# Patient Record
Sex: MALE | Race: WHITE | NOT HISPANIC OR LATINO | Employment: OTHER | URBAN - METROPOLITAN AREA
[De-identification: names, ages, dates, MRNs, and addresses within clinical notes are randomized per-mention and may not be internally consistent; named-entity substitution may affect disease eponyms.]

---

## 2019-10-29 ENCOUNTER — TRANSFERRED RECORDS (OUTPATIENT)
Dept: HEALTH INFORMATION MANAGEMENT | Facility: CLINIC | Age: 69
End: 2019-10-29

## 2021-07-31 ENCOUNTER — HOSPITAL ENCOUNTER (EMERGENCY)
Facility: CLINIC | Age: 71
Discharge: HOME | End: 2021-07-31
Attending: EMERGENCY MEDICINE | Admitting: EMERGENCY MEDICINE
Payer: MEDICARE

## 2021-07-31 ENCOUNTER — APPOINTMENT (OUTPATIENT)
Dept: MRI IMAGING | Facility: CLINIC | Age: 71
End: 2021-07-31
Attending: EMERGENCY MEDICINE
Payer: MEDICARE

## 2021-07-31 VITALS
HEIGHT: 70 IN | RESPIRATION RATE: 16 BRPM | TEMPERATURE: 96.6 F | HEART RATE: 79 BPM | BODY MASS INDEX: 34.36 KG/M2 | WEIGHT: 240 LBS | SYSTOLIC BLOOD PRESSURE: 125 MMHG | DIASTOLIC BLOOD PRESSURE: 81 MMHG | OXYGEN SATURATION: 93 %

## 2021-07-31 DIAGNOSIS — R42 VERTIGO: ICD-10-CM

## 2021-07-31 LAB
ALBUMIN UR-MCNC: NEGATIVE MG/DL
ANION GAP SERPL CALCULATED.3IONS-SCNC: 2 MMOL/L (ref 3–14)
APPEARANCE UR: CLEAR
ATRIAL RATE - MUSE: 80 BPM
BASOPHILS # BLD AUTO: 0 10E3/UL (ref 0–0.2)
BASOPHILS NFR BLD AUTO: 1 %
BILIRUB UR QL STRIP: NEGATIVE
BUN SERPL-MCNC: 13 MG/DL (ref 7–30)
CALCIUM SERPL-MCNC: 9.3 MG/DL (ref 8.5–10.1)
CHLORIDE BLD-SCNC: 106 MMOL/L (ref 94–109)
CO2 SERPL-SCNC: 31 MMOL/L (ref 20–32)
COLOR UR AUTO: YELLOW
CREAT SERPL-MCNC: 1.12 MG/DL (ref 0.66–1.25)
DIASTOLIC BLOOD PRESSURE - MUSE: NORMAL MMHG
EOSINOPHIL # BLD AUTO: 0.1 10E3/UL (ref 0–0.7)
EOSINOPHIL NFR BLD AUTO: 2 %
ERYTHROCYTE [DISTWIDTH] IN BLOOD BY AUTOMATED COUNT: 13.1 % (ref 10–15)
GFR SERPL CREATININE-BSD FRML MDRD: 65 ML/MIN/1.73M2
GLUCOSE BLD-MCNC: 112 MG/DL (ref 70–99)
GLUCOSE UR STRIP-MCNC: NEGATIVE MG/DL
HCT VFR BLD AUTO: 44.3 % (ref 40–53)
HGB BLD-MCNC: 14.9 G/DL (ref 13.3–17.7)
HGB UR QL STRIP: NEGATIVE
IMM GRANULOCYTES # BLD: 0 10E3/UL
IMM GRANULOCYTES NFR BLD: 1 %
INTERPRETATION ECG - MUSE: NORMAL
KETONES UR STRIP-MCNC: NEGATIVE MG/DL
LEUKOCYTE ESTERASE UR QL STRIP: NEGATIVE
LYMPHOCYTES # BLD AUTO: 2.5 10E3/UL (ref 0.8–5.3)
LYMPHOCYTES NFR BLD AUTO: 38 %
MCH RBC QN AUTO: 33 PG (ref 26.5–33)
MCHC RBC AUTO-ENTMCNC: 33.6 G/DL (ref 31.5–36.5)
MCV RBC AUTO: 98 FL (ref 78–100)
MONOCYTES # BLD AUTO: 0.8 10E3/UL (ref 0–1.3)
MONOCYTES NFR BLD AUTO: 13 %
MUCOUS THREADS #/AREA URNS LPF: PRESENT /LPF
NEUTROPHILS # BLD AUTO: 3.1 10E3/UL (ref 1.6–8.3)
NEUTROPHILS NFR BLD AUTO: 45 %
NITRATE UR QL: NEGATIVE
NRBC # BLD AUTO: 0 10E3/UL
NRBC BLD AUTO-RTO: 0 /100
P AXIS - MUSE: 27 DEGREES
PH UR STRIP: 5.5 [PH] (ref 5–7)
PLATELET # BLD AUTO: 186 10E3/UL (ref 150–450)
POTASSIUM BLD-SCNC: 4.7 MMOL/L (ref 3.4–5.3)
PR INTERVAL - MUSE: 202 MS
QRS DURATION - MUSE: 126 MS
QT - MUSE: 406 MS
QTC - MUSE: 468 MS
R AXIS - MUSE: -84 DEGREES
RBC # BLD AUTO: 4.52 10E6/UL (ref 4.4–5.9)
RBC URINE: 2 /HPF
SODIUM SERPL-SCNC: 139 MMOL/L (ref 133–144)
SP GR UR STRIP: 1.02 (ref 1–1.03)
SYSTOLIC BLOOD PRESSURE - MUSE: NORMAL MMHG
T AXIS - MUSE: 5 DEGREES
UROBILINOGEN UR STRIP-MCNC: NORMAL MG/DL
VENTRICULAR RATE- MUSE: 80 BPM
WBC # BLD AUTO: 6.6 10E3/UL (ref 4–11)
WBC URINE: 0 /HPF

## 2021-07-31 PROCEDURE — 70551 MRI BRAIN STEM W/O DYE: CPT

## 2021-07-31 PROCEDURE — 36415 COLL VENOUS BLD VENIPUNCTURE: CPT | Performed by: EMERGENCY MEDICINE

## 2021-07-31 PROCEDURE — 85025 COMPLETE CBC W/AUTO DIFF WBC: CPT | Performed by: EMERGENCY MEDICINE

## 2021-07-31 PROCEDURE — 93005 ELECTROCARDIOGRAM TRACING: CPT

## 2021-07-31 PROCEDURE — 99285 EMERGENCY DEPT VISIT HI MDM: CPT | Mod: 25

## 2021-07-31 PROCEDURE — 250N000013 HC RX MED GY IP 250 OP 250 PS 637: Performed by: EMERGENCY MEDICINE

## 2021-07-31 PROCEDURE — 80048 BASIC METABOLIC PNL TOTAL CA: CPT | Performed by: EMERGENCY MEDICINE

## 2021-07-31 PROCEDURE — 81001 URINALYSIS AUTO W/SCOPE: CPT | Performed by: EMERGENCY MEDICINE

## 2021-07-31 RX ORDER — MECLIZINE HYDROCHLORIDE 25 MG/1
25 TABLET ORAL ONCE
Status: COMPLETED | OUTPATIENT
Start: 2021-07-31 | End: 2021-07-31

## 2021-07-31 RX ORDER — MECLIZINE HYDROCHLORIDE 25 MG/1
25 TABLET ORAL 4 TIMES DAILY PRN
Qty: 30 TABLET | Refills: 0 | Status: SHIPPED | OUTPATIENT
Start: 2021-07-31

## 2021-07-31 RX ADMIN — MECLIZINE HYDROCHLORIDE 25 MG: 25 TABLET ORAL at 21:21

## 2021-07-31 ASSESSMENT — ENCOUNTER SYMPTOMS
DIZZINESS: 1
LIGHT-HEADEDNESS: 0
WEAKNESS: 0
ABDOMINAL PAIN: 0
NUMBNESS: 0

## 2021-07-31 ASSESSMENT — MIFFLIN-ST. JEOR: SCORE: 1844.88

## 2021-07-31 NOTE — ED PROVIDER NOTES
"  History   Chief Complaint:  Dizziness       HPI   Anthony Briceno is a 71 year old male with history of hypertension and high cholesterol who presents with dizziness. He reports that when he was walking around noon today he kept falling to the left and felt dizzy. He denies pain. He reports that he ate some food and it did not help. He notes that he had a blood pressure of 136/102 at home and called his primary care who recommended that he come in. He reports that while sitting he feels \"fuzzy, not quite tingling but similar\". He reports that he feels like he was \"having a buzz without having alcohol\". He denies double vision. He denies lightheadedness today. He denies abdominal pain. He denies leg swelling. He denies numbness and weakness. He reports a past syncopal episode at a party and had an electrical heart problem at that time. He reports that he exercises about 45/50 minutes 3 times a week. He reports that he uses a walking stick for stability. He notes that he has difficulty with balance when not using his walking stick.       Review of Systems   Eyes: Negative for visual disturbance.   Cardiovascular: Negative for leg swelling.   Gastrointestinal: Negative for abdominal pain.   Neurological: Positive for dizziness. Negative for weakness, light-headedness and numbness.   All other systems reviewed and are negative.      Allergies:  Ancef [Cefazolin Sodium]    Medications:  Aspirin   Synthroid   Omeprazole   Claritin   Simvastatin     Past Medical History:    GERD  High cholesterol   Hypertension   Hypothyroid   Prostate cancer   Syncope     Past Surgical History:    Appendectomy   Colonoscopy  Genitourinary surgery  Hernia repair   Prostate surgery     Social History:  Presents with spouse.  PCP Akilah Fairchild PA-C.     Physical Exam     Patient Vitals for the past 24 hrs:   BP Temp Temp src Pulse Resp SpO2 Height Weight   07/31/21 1715 125/81 -- -- 79 -- 93 % -- --   07/31/21 1714 126/88 -- -- 81 -- 94 % -- " "--   07/31/21 1609 126/70 (!) 96.6  F (35.9  C) Temporal 83 16 95 % 1.778 m (5' 10\") 108.9 kg (240 lb)       Physical Exam  Eye:  Pupils are equal, round, and reactive.  Extraocular movements intact.    ENT:  No rhinorrhea.  Moist mucus membranes.  Normal tongue and tonsil.    Cardiac:  Regular rate and rhythm.  No murmurs, gallops, or rubs.    Pulmonary:  Clear to auscultation bilaterally.  No wheezes, rales, or rhonchi.    Abdomen:  Positive bowel sounds.  Abdomen is soft and non-distended, without focal tenderness.    Musculoskeletal:  Normal movement of all extremities without evidence for deficit.    Skin:  Warm and dry without rashes.    Neurologic:  CN II - XII intact.  5/5 strength in all extremities.  Normal sensation throughout.  Normal finger to nose and heel to shin.  2+ patellar reflexes.  Normal gait.    Psychiatric:  Normal affect with appropriate interaction with examiner.      Emergency Department Course   ECG  ECG taken at 1618, ECG read at 1845  NSR. Left axis deviation. Right bundle branch block. Minimal voltage criteria for LVH may be normal variant. Possible Lateral infarct, age undetermined. Inferior infarct, age undetermined.    No significant change as compared to prior, dated 7/26/16.  Rate 80 bpm. IN interval 202 ms. QRS duration 126 ms. QT/QTc 406/468 ms. P-R-T axes 27 -84 5.     Imaging:  MR Brain w/o contrast:  1. No acute intracranial abnormality.   2. Small chronic area of cortical/subcortical gliosis in the   paramedian left occipital lobe, which may represent a small chronic   infarct.   3. Mild generalized brain parenchymal volume loss and presumed chronic   small vessel ischemic disease.  as per radiology.     Laboratory:   CBC: WBC 6.6, HGB 14.9,   BMP: Glucose 112(H), Anion Gap: 2 (L), o/w WNL (Creatinine: 1.12)  UA: Mucous: Present, o/w Negative     Emergency Department Course:    Reviewed:  I reviewed nursing notes, vitals, past medical history and care " "everywhere    Assessments:  1814 I obtained history and examined the patient as noted above.   2055 I rechecked the patient and explained findings.     Interventions:  1843 sodium chloride 3 mL, IC    Disposition:  The patient was discharged to home.       Impression & Plan     CMS Diagnoses: None    Medical Decision Making:  This life a 71-year-old gentleman presents to us with a sensation that he is \"falling to the left.\"  He notes that this happened suddenly today when he first got out of bed.  He notes that he feels well when he is at rest.  However, when he turns his head or especially if he stands up that he starts to feel this dizziness sensation.  He has never experienced vertigo before.  He denies any true lightheadedness.  He denies vision changes, weakness, numbness, or slurring of his speech.    On my exam, he has an essentially normal neurologic exam.  However, he did feel return of his vertigo when he got up to walk to the bathroom.  Considering his age and his symptoms, I elected to obtain an MRI of the head which fortunately shows no evidence of a posterior circulation ischemic event.  With this, I feel central processes have been ruled out and he is safe for discharge home on meclizine.  He will follow closely with his primary team.  He will otherwise return to us for any worsening of condition or other emergent concerns.      Covid-19  Anthony Briceno was evaluated during a global COVID-19 pandemic, which necessitated consideration that the patient might be at risk for infection with the SARS-CoV-2 virus that causes COVID-19.   Applicable protocols for evaluation were followed during the patient's care.   COVID-19 was considered as part of the patient's evaluation.     Diagnosis:    ICD-10-CM    1. Vertigo  R42        Discharge Medications:  New Prescriptions    MECLIZINE (ANTIVERT) 25 MG TABLET    Take 1 tablet (25 mg) by mouth 4 times daily as needed for dizziness       Scribe Disclosure:  I, " Nory Valencia, am serving as a scribe at 5:08 PM on 7/31/2021 to document services personally performed by Trierweiler, Chad A, MD based on my observations and the provider's statements to me.        Trierweiler, Chad A, MD  08/01/21 [id number]

## 2021-07-31 NOTE — ED TRIAGE NOTES
"Patient got up and was walking. Became dizzy and states was falling to the left. States started 2 hours ago. Denies numbness or tingliing. Patient describes it as \"Having a buzz without having alcohol.\" Patient has equal, bilateral hand grasp. No facial droop noted. Tongue is aligned to center. Speech is clear. States has been using a walking stick for the past 3 years. Denies headache, numbness or tingling.     Patient had syncopal episode 7-8 years ago. \"something about heart. The next time he comes in he would need a pacemaker.\"  "

## 2024-02-09 ENCOUNTER — TRANSFERRED RECORDS (OUTPATIENT)
Dept: HEALTH INFORMATION MANAGEMENT | Facility: CLINIC | Age: 74
End: 2024-02-09
Payer: COMMERCIAL

## 2024-02-09 ENCOUNTER — MEDICAL CORRESPONDENCE (OUTPATIENT)
Dept: HEALTH INFORMATION MANAGEMENT | Facility: CLINIC | Age: 74
End: 2024-02-09
Payer: COMMERCIAL

## 2024-02-12 DIAGNOSIS — I20.0 UNSTABLE ANGINA (H): Primary | ICD-10-CM

## 2024-02-13 ENCOUNTER — OFFICE VISIT (OUTPATIENT)
Dept: CARDIOLOGY | Facility: CLINIC | Age: 74
End: 2024-02-13
Attending: FAMILY MEDICINE
Payer: MEDICARE

## 2024-02-13 VITALS
SYSTOLIC BLOOD PRESSURE: 118 MMHG | HEART RATE: 67 BPM | HEIGHT: 71 IN | WEIGHT: 245.9 LBS | BODY MASS INDEX: 34.43 KG/M2 | DIASTOLIC BLOOD PRESSURE: 76 MMHG

## 2024-02-13 DIAGNOSIS — I20.0 UNSTABLE ANGINA (H): ICD-10-CM

## 2024-02-13 DIAGNOSIS — R07.9 CHEST PAIN, UNSPECIFIED TYPE: Primary | ICD-10-CM

## 2024-02-13 PROCEDURE — 99204 OFFICE O/P NEW MOD 45 MIN: CPT | Performed by: INTERNAL MEDICINE

## 2024-02-13 PROCEDURE — 93000 ELECTROCARDIOGRAM COMPLETE: CPT | Performed by: INTERNAL MEDICINE

## 2024-02-13 RX ORDER — METOPROLOL SUCCINATE 25 MG/1
1 TABLET, EXTENDED RELEASE ORAL
COMMUNITY
Start: 2024-02-09

## 2024-02-13 RX ORDER — NITROGLYCERIN 0.4 MG/1
TABLET SUBLINGUAL
Qty: 15 TABLET | Refills: 1 | Status: SHIPPED | OUTPATIENT
Start: 2024-02-13

## 2024-02-13 NOTE — PROGRESS NOTES
CARDIOLOGY CONSULT    REASON FOR CONSULT: chest pain    PRIMARY CARE PHYSICIAN:  Felicia Fairchild    HISTORY OF PRESENT ILLNESS:  Mr. Briceno is a 73 year old gentleman with PMH significant for hypertension who presents for the evaluation of exertional chest discomfort.  Anthony states that he has been experiencing exertional chest discomfort over the past two months or so.  He describes it as a pressure in the center of the chest.   It generally comes on with activity and goes away with rest.  He does note it seems to take less activity to bring the discomfort on.   He was recently seen by his primary care provider who referred him to cardiology for further evaluation.  ECG at outside clinic demonstrates normal sinus rhythm, no ST segment changes.  Labs including BMP and CBC at outside clinic are within normal limits.      PAST MEDICAL HISTORY:   GERD   Hypertension   Prostate Cancer   GERD      MEDICATIONS:  Current Outpatient Medications   Medication    ASPIRIN PO    Levothyroxine Sodium (SYNTHROID PO)    loratadine (CLARITIN) 10 MG tablet    meclizine (ANTIVERT) 25 MG tablet    OMEPRAZOLE PO    SIMVASTATIN PO     No current facility-administered medications for this visit.       ALLERGIES:  Allergies   Allergen Reactions    Ancef [Cefazolin Sodium]      anaphyllaxis       SOCIAL HISTORY:  Nonsmoker, no significant ETOH    FAMILY HISTORY:  I have reviewed this patient's family history and updated it with pertinent information if needed.   No family history on file.    REVIEW OF SYSTEMS:  A complete ROS was obtained and the pertinent positives are outlined in the history of present illness above.  The remainder of systems is negative.      PHYSICAL EXAM:                     Vital Signs with Ranges  BP: ()/()   Arterial Line BP: ()/()   0 lbs 0 oz    Constitutional: awake, alert, no distress  Eyes: PERRL, sclera nonicteric  ENT: trachea midline  Respiratory: CTAB  Cardiovascular: RRR no m/r/g, no VJD  GI:  nondistended, nontender, bowel sounds present  Lymph/Hematologic: no lymphadenopathy  Skin: dry, no rash  Musculoskeletal: good muscle tone, strength 5/5 in upper and lower extremities  Neurologic: no focal deficits  Neuropsychiatric: appropriate affact    DATA:  Labs:   From outside clinic reviewed including CBC, BMP    EKG: From outside clinic reviewed.  Normal sinus rhythm without ST segment changes        ASSESSMENT:   Chest pain:  Concerning for accelerating angina.     Hypertension:  At goal   Hyperlipidemia:  On simvastatin    RECOMMENDATIONS:  1.  Recommend proceeding with coronary angiogram for definitive evaluation.  Given accelerating symptoms, this will be scheduled urgently.  Risks/benefits/alternatives were discussed with Anthony and he is agreeable to proceeding  2.  Continue ASA, statin  3.  Given rx for nitroglycerin 0.4 mg SL PRN chest pain and instructed on appropriate use  4.  Follow up after angiogram will be arranged      Alyssa Sinclair MD [facility]  February 13, 2024      I spent a total of 50 minutes providing patient care.  This time spent includes chart review, time spent with the patient during the clinic visit and time spent afterwards providing necessary documentation.

## 2024-02-13 NOTE — PATIENT INSTRUCTIONS
-Schedule coronary angiogram  -Continue ASA, simvastatin  -Nitroglycerin 0.4 mg under the tongue for chest pain at rest

## 2024-02-13 NOTE — LETTER
2/13/2024    Felicia Fairchild PA-C  [address] [facility] [address]  [place] [address]    RE: Anthony Briceno       Dear Colleague,     I had the pleasure of seeing Anthony Briceno in the [facility].  CARDIOLOGY CONSULT    REASON FOR CONSULT: chest pain    PRIMARY CARE PHYSICIAN:  Felicia Fairchild    HISTORY OF PRESENT ILLNESS:  Mr. Briceno is a 73 year old gentleman with PMH significant for hypertension who presents for the evaluation of exertional chest discomfort.  Anthony states that he has been experiencing exertional chest discomfort over the past two months or so.  He describes it as a pressure in the center of the chest.   It generally comes on with activity and goes away with rest.  He does note it seems to take less activity to bring the discomfort on.   He was recently seen by his primary care provider who referred him to cardiology for further evaluation.  ECG at outside clinic demonstrates normal sinus rhythm, no ST segment changes.  Labs including BMP and CBC at outside clinic are within normal limits.      PAST MEDICAL HISTORY:   GERD   Hypertension   Prostate Cancer   GERD      MEDICATIONS:  Current Outpatient Medications   Medication    ASPIRIN PO    Levothyroxine Sodium (SYNTHROID PO)    loratadine (CLARITIN) 10 MG tablet    meclizine (ANTIVERT) 25 MG tablet    OMEPRAZOLE PO    SIMVASTATIN PO     No current facility-administered medications for this visit.       ALLERGIES:  Allergies   Allergen Reactions    Ancef [Cefazolin Sodium]      anaphyllaxis       SOCIAL HISTORY:  Nonsmoker, no significant ETOH    FAMILY HISTORY:  I have reviewed this patient's family history and updated it with pertinent information if needed.   No family history on file.    REVIEW OF SYSTEMS:  A complete ROS was obtained and the pertinent positives are outlined in the history of present illness above.  The remainder of systems is negative.      PHYSICAL EXAM:                     Vital Signs with Ranges  BP:  ()/()   Arterial Line BP: ()/()   0 lbs 0 oz    Constitutional: awake, alert, no distress  Eyes: PERRL, sclera nonicteric  ENT: trachea midline  Respiratory: CTAB  Cardiovascular: RRR no m/r/g, no VJD  GI: nondistended, nontender, bowel sounds present  Lymph/Hematologic: no lymphadenopathy  Skin: dry, no rash  Musculoskeletal: good muscle tone, strength 5/5 in upper and lower extremities  Neurologic: no focal deficits  Neuropsychiatric: appropriate affact    DATA:  Labs:   From outside clinic reviewed including CBC, BMP    EKG: From outside clinic reviewed.  Normal sinus rhythm without ST segment changes        ASSESSMENT:   Chest pain:  Concerning for accelerating angina.     Hypertension:  At goal   Hyperlipidemia:  On simvastatin    RECOMMENDATIONS:  1.  Recommend proceeding with coronary angiogram for definitive evaluation.  Given accelerating symptoms, this will be scheduled urgently.  Risks/benefits/alternatives were discussed with Anthony and he is agreeable to proceeding  2.  Continue ASA, statin  3.  Given rx for nitroglycerin 0.4 mg SL PRN chest pain and instructed on appropriate use  4.  Follow up after angiogram will be arranged      Alyssa Sinclair MD [facility]  February 13, 2024      I spent a total of 50 minutes providing patient care.  This time spent includes chart review, time spent with the patient during the clinic visit and time spent afterwards providing necessary documentation.         Thank you for allowing me to participate in the care of your patient.      Sincerely,     Alyssa Sinclair MD     [facility]  cc:   Felicia Fairchild, HALLE  [address]  [place] [address]

## 2024-02-15 DIAGNOSIS — I20.0 UNSTABLE ANGINA (H): Primary | ICD-10-CM

## 2024-02-15 RX ORDER — ASPIRIN 325 MG
325 TABLET ORAL ONCE
Status: CANCELLED | OUTPATIENT
Start: 2024-02-15 | End: 2024-02-15

## 2024-02-15 RX ORDER — LIDOCAINE 40 MG/G
CREAM TOPICAL
Status: CANCELLED | OUTPATIENT
Start: 2024-02-15

## 2024-02-15 RX ORDER — POTASSIUM CHLORIDE 1500 MG/1
20 TABLET, EXTENDED RELEASE ORAL
Status: CANCELLED | OUTPATIENT
Start: 2024-02-15

## 2024-02-15 RX ORDER — SODIUM CHLORIDE 9 MG/ML
INJECTION, SOLUTION INTRAVENOUS CONTINUOUS
Status: CANCELLED | OUTPATIENT
Start: 2024-02-15

## 2024-02-15 RX ORDER — ASPIRIN 81 MG/1
243 TABLET, CHEWABLE ORAL ONCE
Status: CANCELLED | OUTPATIENT
Start: 2024-02-15

## 2024-02-15 NOTE — PROGRESS NOTES
Coronary angiogram/PCI/Right Heart Cath prep instructions.     Patient is scheduled for a Coronary Angiogram at [facility] - [address] [place] [address] - Main Entrance of the Hospital on 2/16/24.  Check in time is at 7:30 am and procedure to follow.    Patient instructed to remain NPO for solid foods 8 hours prior to arrival and may have clear liquids up to 2 hours prior to arrival.    Patient does not require extra fluids prior to procedure.    Patient is not diabetic.    Patient is not on anticoagulation.    Patient is not on diuretics.     Patient is taking ASA 81mg daily and will take 4 tabs (324mg) the morning of the procedure.    Pt is not on a SGLT2 inhibitor.    Pt is not on a GLP-1 Agonist    Patient advised to take their other daily medications the morning of the procedure with small sips of water.     Verified patient does not have a contrast allergy.    Verified patient has someone available to drive them home from the hospital and can stay with them for 24 hours after the procedure.     Patient advised to notify care team with any new COVID like symptoms prior to procedure.    Patient will check their temperature the morning of procedure and call [facility] at [phone] if temp is >100.0.    Patient is aware of visitor policy.    Patient expresses understanding of above instructions and denies further questions at this time.

## 2024-02-16 ENCOUNTER — HOSPITAL ENCOUNTER (OUTPATIENT)
Facility: CLINIC | Age: 74
Discharge: HOME | End: 2024-02-16
Admitting: INTERNAL MEDICINE
Payer: MEDICARE

## 2024-02-16 ENCOUNTER — APPOINTMENT (OUTPATIENT)
Dept: CARDIOLOGY | Facility: CLINIC | Age: 74
End: 2024-02-16
Attending: INTERNAL MEDICINE
Payer: MEDICARE

## 2024-02-16 VITALS
HEIGHT: 70 IN | RESPIRATION RATE: 18 BRPM | OXYGEN SATURATION: 95 % | BODY MASS INDEX: 35.07 KG/M2 | DIASTOLIC BLOOD PRESSURE: 65 MMHG | SYSTOLIC BLOOD PRESSURE: 100 MMHG | TEMPERATURE: 98 F | HEART RATE: 67 BPM | WEIGHT: 245 LBS

## 2024-02-16 DIAGNOSIS — I20.0 UNSTABLE ANGINA (H): ICD-10-CM

## 2024-02-16 DIAGNOSIS — Z98.61 POSTSURGICAL PERCUTANEOUS TRANSLUMINAL CORONARY ANGIOPLASTY STATUS: Primary | ICD-10-CM

## 2024-02-16 DIAGNOSIS — R07.9 CHEST PAIN, UNSPECIFIED TYPE: ICD-10-CM

## 2024-02-16 PROBLEM — Z98.890 STATUS POST CORONARY ANGIOGRAM: Status: ACTIVE | Noted: 2024-02-16

## 2024-02-16 LAB
ABO/RH(D): NORMAL
ACT BLD: 282 SECONDS (ref 74–150)
ANION GAP SERPL CALCULATED.3IONS-SCNC: 7 MMOL/L (ref 7–15)
ANTIBODY SCREEN: NEGATIVE
APTT PPP: 28 SECONDS (ref 22–38)
BUN SERPL-MCNC: 14.1 MG/DL (ref 8–23)
CALCIUM SERPL-MCNC: 9.1 MG/DL (ref 8.8–10.2)
CHLORIDE SERPL-SCNC: 103 MMOL/L (ref 98–107)
CREAT SERPL-MCNC: 1.08 MG/DL (ref 0.67–1.17)
DEPRECATED HCO3 PLAS-SCNC: 29 MMOL/L (ref 22–29)
EGFRCR SERPLBLD CKD-EPI 2021: 72 ML/MIN/1.73M2
ERYTHROCYTE [DISTWIDTH] IN BLOOD BY AUTOMATED COUNT: 13.2 % (ref 10–15)
GLUCOSE SERPL-MCNC: 110 MG/DL (ref 70–99)
HCT VFR BLD AUTO: 43.1 % (ref 40–53)
HGB BLD-MCNC: 13.1 G/DL (ref 13.3–17.7)
HGB BLD-MCNC: 14.7 G/DL (ref 13.3–17.7)
INR PPP: 0.96 (ref 0.85–1.15)
LVEF ECHO: NORMAL
MCH RBC QN AUTO: 33.3 PG (ref 26.5–33)
MCHC RBC AUTO-ENTMCNC: 34.1 G/DL (ref 31.5–36.5)
MCV RBC AUTO: 98 FL (ref 78–100)
PLATELET # BLD AUTO: 151 10E3/UL (ref 150–450)
POTASSIUM SERPL-SCNC: 4.1 MMOL/L (ref 3.4–5.3)
RBC # BLD AUTO: 4.42 10E6/UL (ref 4.4–5.9)
SODIUM SERPL-SCNC: 139 MMOL/L (ref 135–145)
SPECIMEN EXPIRATION DATE: NORMAL
WBC # BLD AUTO: 5.7 10E3/UL (ref 4–11)

## 2024-02-16 PROCEDURE — 99152 MOD SED SAME PHYS/QHP 5/>YRS: CPT | Mod: GC | Performed by: INTERNAL MEDICINE

## 2024-02-16 PROCEDURE — G0278 ILIAC ART ANGIO,CARDIAC CATH: HCPCS | Performed by: INTERNAL MEDICINE

## 2024-02-16 PROCEDURE — 93010 ELECTROCARDIOGRAM REPORT: CPT | Performed by: INTERNAL MEDICINE

## 2024-02-16 PROCEDURE — 85018 HEMOGLOBIN: CPT | Mod: 91

## 2024-02-16 PROCEDURE — 75625 CONTRAST EXAM ABDOMINL AORTA: CPT | Performed by: INTERNAL MEDICINE

## 2024-02-16 PROCEDURE — C1874 STENT, COATED/COV W/DEL SYS: HCPCS | Performed by: INTERNAL MEDICINE

## 2024-02-16 PROCEDURE — 85018 HEMOGLOBIN: CPT | Performed by: INTERNAL MEDICINE

## 2024-02-16 PROCEDURE — C1894 INTRO/SHEATH, NON-LASER: HCPCS | Performed by: INTERNAL MEDICINE

## 2024-02-16 PROCEDURE — 36415 COLL VENOUS BLD VENIPUNCTURE: CPT | Performed by: INTERNAL MEDICINE

## 2024-02-16 PROCEDURE — 250N000009 HC RX 250: Performed by: INTERNAL MEDICINE

## 2024-02-16 PROCEDURE — 93458 L HRT ARTERY/VENTRICLE ANGIO: CPT | Mod: XE | Performed by: INTERNAL MEDICINE

## 2024-02-16 PROCEDURE — 93454 CORONARY ARTERY ANGIO S&I: CPT | Performed by: INTERNAL MEDICINE

## 2024-02-16 PROCEDURE — C1769 GUIDE WIRE: HCPCS | Performed by: INTERNAL MEDICINE

## 2024-02-16 PROCEDURE — 250N000011 HC RX IP 250 OP 636: Performed by: INTERNAL MEDICINE

## 2024-02-16 PROCEDURE — C9600 PERC DRUG-EL COR STENT SING: HCPCS | Mod: LD | Performed by: INTERNAL MEDICINE

## 2024-02-16 PROCEDURE — 250N000013 HC RX MED GY IP 250 OP 250 PS 637: Performed by: INTERNAL MEDICINE

## 2024-02-16 PROCEDURE — 80048 BASIC METABOLIC PNL TOTAL CA: CPT | Performed by: INTERNAL MEDICINE

## 2024-02-16 PROCEDURE — 93454 CORONARY ARTERY ANGIO S&I: CPT | Mod: 26 | Performed by: INTERNAL MEDICINE

## 2024-02-16 PROCEDURE — 93005 ELECTROCARDIOGRAM TRACING: CPT

## 2024-02-16 PROCEDURE — 999N000184 HC STATISTIC TELEMETRY

## 2024-02-16 PROCEDURE — 92928 PRQ TCAT PLMT NTRAC ST 1 LES: CPT | Mod: LD | Performed by: INTERNAL MEDICINE

## 2024-02-16 PROCEDURE — 36415 COLL VENOUS BLD VENIPUNCTURE: CPT

## 2024-02-16 PROCEDURE — 86900 BLOOD TYPING SEROLOGIC ABO: CPT

## 2024-02-16 PROCEDURE — 999N000071 HC STATISTIC HEART CATH LAB OR EP LAB

## 2024-02-16 PROCEDURE — 85347 COAGULATION TIME ACTIVATED: CPT

## 2024-02-16 PROCEDURE — 85730 THROMBOPLASTIN TIME PARTIAL: CPT | Performed by: INTERNAL MEDICINE

## 2024-02-16 PROCEDURE — C1760 CLOSURE DEV, VASC: HCPCS | Performed by: INTERNAL MEDICINE

## 2024-02-16 PROCEDURE — 999N000054 HC STATISTIC EKG NON-CHARGEABLE

## 2024-02-16 PROCEDURE — 258N000003 HC RX IP 258 OP 636: Performed by: INTERNAL MEDICINE

## 2024-02-16 PROCEDURE — 99152 MOD SED SAME PHYS/QHP 5/>YRS: CPT | Mod: XE | Performed by: INTERNAL MEDICINE

## 2024-02-16 PROCEDURE — C1887 CATHETER, GUIDING: HCPCS | Performed by: INTERNAL MEDICINE

## 2024-02-16 PROCEDURE — C1725 CATH, TRANSLUMIN NON-LASER: HCPCS | Performed by: INTERNAL MEDICINE

## 2024-02-16 PROCEDURE — 93325 DOPPLER ECHO COLOR FLOW MAPG: CPT

## 2024-02-16 PROCEDURE — 85610 PROTHROMBIN TIME: CPT | Performed by: INTERNAL MEDICINE

## 2024-02-16 PROCEDURE — 93308 TTE F-UP OR LMTD: CPT | Mod: 26 | Performed by: INTERNAL MEDICINE

## 2024-02-16 PROCEDURE — 272N000001 HC OR GENERAL SUPPLY STERILE: Performed by: INTERNAL MEDICINE

## 2024-02-16 PROCEDURE — 99153 MOD SED SAME PHYS/QHP EA: CPT | Performed by: INTERNAL MEDICINE

## 2024-02-16 DEVICE — CLOSURE ANGIOSEAL 6FR 610130: Type: IMPLANTABLE DEVICE | Status: FUNCTIONAL

## 2024-02-16 DEVICE — STENT COR ONYX FRONTIER 12X2.25MM ONYXNG22512UX: Type: IMPLANTABLE DEVICE | Status: FUNCTIONAL

## 2024-02-16 RX ORDER — ASPIRIN 325 MG
325 TABLET ORAL ONCE
Status: CANCELLED | OUTPATIENT
Start: 2024-02-16 | End: 2024-02-16

## 2024-02-16 RX ORDER — FENTANYL CITRATE 50 UG/ML
25 INJECTION, SOLUTION INTRAMUSCULAR; INTRAVENOUS
Status: DISCONTINUED | OUTPATIENT
Start: 2024-02-16 | End: 2024-02-16 | Stop reason: HOSPADM

## 2024-02-16 RX ORDER — HYDRALAZINE HYDROCHLORIDE 20 MG/ML
10 INJECTION INTRAMUSCULAR; INTRAVENOUS EVERY 4 HOURS PRN
Status: DISCONTINUED | OUTPATIENT
Start: 2024-02-16 | End: 2024-02-16 | Stop reason: HOSPADM

## 2024-02-16 RX ORDER — NALOXONE HYDROCHLORIDE 0.4 MG/ML
0.4 INJECTION, SOLUTION INTRAMUSCULAR; INTRAVENOUS; SUBCUTANEOUS
Status: DISCONTINUED | OUTPATIENT
Start: 2024-02-16 | End: 2024-02-16 | Stop reason: HOSPADM

## 2024-02-16 RX ORDER — NITROGLYCERIN 5 MG/ML
VIAL (ML) INTRAVENOUS
Status: DISCONTINUED | OUTPATIENT
Start: 2024-02-16 | End: 2024-02-16 | Stop reason: HOSPADM

## 2024-02-16 RX ORDER — FLUMAZENIL 0.1 MG/ML
0.2 INJECTION, SOLUTION INTRAVENOUS
Status: DISCONTINUED | OUTPATIENT
Start: 2024-02-16 | End: 2024-02-16 | Stop reason: HOSPADM

## 2024-02-16 RX ORDER — NALOXONE HYDROCHLORIDE 0.4 MG/ML
0.2 INJECTION, SOLUTION INTRAMUSCULAR; INTRAVENOUS; SUBCUTANEOUS
Status: DISCONTINUED | OUTPATIENT
Start: 2024-02-16 | End: 2024-02-16 | Stop reason: HOSPADM

## 2024-02-16 RX ORDER — IOPAMIDOL 755 MG/ML
INJECTION, SOLUTION INTRAVASCULAR
Status: DISCONTINUED | OUTPATIENT
Start: 2024-02-16 | End: 2024-02-16 | Stop reason: HOSPADM

## 2024-02-16 RX ORDER — SODIUM CHLORIDE 9 MG/ML
INJECTION, SOLUTION INTRAVENOUS CONTINUOUS
Status: CANCELLED | OUTPATIENT
Start: 2024-02-16

## 2024-02-16 RX ORDER — FENTANYL CITRATE 50 UG/ML
INJECTION, SOLUTION INTRAMUSCULAR; INTRAVENOUS
Status: DISCONTINUED | OUTPATIENT
Start: 2024-02-16 | End: 2024-02-16 | Stop reason: HOSPADM

## 2024-02-16 RX ORDER — ASPIRIN 81 MG/1
243 TABLET, CHEWABLE ORAL ONCE
Status: COMPLETED | OUTPATIENT
Start: 2024-02-16 | End: 2024-02-16

## 2024-02-16 RX ORDER — LIDOCAINE 40 MG/G
CREAM TOPICAL
Status: CANCELLED | OUTPATIENT
Start: 2024-02-16

## 2024-02-16 RX ORDER — LORAZEPAM 0.5 MG/1
0.5 TABLET ORAL
Status: CANCELLED | OUTPATIENT
Start: 2024-02-16

## 2024-02-16 RX ORDER — LIDOCAINE 40 MG/G
CREAM TOPICAL
Status: DISCONTINUED | OUTPATIENT
Start: 2024-02-16 | End: 2024-02-16 | Stop reason: HOSPADM

## 2024-02-16 RX ORDER — LORAZEPAM 2 MG/ML
0.5 INJECTION INTRAMUSCULAR
Status: CANCELLED | OUTPATIENT
Start: 2024-02-16

## 2024-02-16 RX ORDER — POTASSIUM CHLORIDE 1500 MG/1
20 TABLET, EXTENDED RELEASE ORAL
Status: CANCELLED | OUTPATIENT
Start: 2024-02-16

## 2024-02-16 RX ORDER — SODIUM CHLORIDE 9 MG/ML
INJECTION, SOLUTION INTRAVENOUS CONTINUOUS
Status: ACTIVE | OUTPATIENT
Start: 2024-02-16 | End: 2024-02-16

## 2024-02-16 RX ORDER — ASPIRIN 81 MG/1
243 TABLET, CHEWABLE ORAL ONCE
Status: CANCELLED | OUTPATIENT
Start: 2024-02-16

## 2024-02-16 RX ORDER — OXYCODONE HYDROCHLORIDE 5 MG/1
10 TABLET ORAL EVERY 4 HOURS PRN
Status: DISCONTINUED | OUTPATIENT
Start: 2024-02-16 | End: 2024-02-16 | Stop reason: HOSPADM

## 2024-02-16 RX ORDER — ASPIRIN 325 MG
325 TABLET ORAL ONCE
Status: COMPLETED | OUTPATIENT
Start: 2024-02-16 | End: 2024-02-16

## 2024-02-16 RX ORDER — OXYCODONE HYDROCHLORIDE 5 MG/1
5 TABLET ORAL EVERY 4 HOURS PRN
Status: DISCONTINUED | OUTPATIENT
Start: 2024-02-16 | End: 2024-02-16 | Stop reason: HOSPADM

## 2024-02-16 RX ORDER — ASPIRIN 81 MG/1
81 TABLET ORAL DAILY
Status: DISCONTINUED | OUTPATIENT
Start: 2024-02-17 | End: 2024-02-16 | Stop reason: HOSPADM

## 2024-02-16 RX ORDER — SODIUM CHLORIDE 9 MG/ML
INJECTION, SOLUTION INTRAVENOUS CONTINUOUS
Status: DISCONTINUED | OUTPATIENT
Start: 2024-02-16 | End: 2024-02-16 | Stop reason: HOSPADM

## 2024-02-16 RX ORDER — ATROPINE SULFATE 0.1 MG/ML
0.5 INJECTION INTRAVENOUS
Status: DISCONTINUED | OUTPATIENT
Start: 2024-02-16 | End: 2024-02-16 | Stop reason: HOSPADM

## 2024-02-16 RX ORDER — ONDANSETRON 4 MG/1
4 TABLET, ORALLY DISINTEGRATING ORAL EVERY 6 HOURS PRN
Status: DISCONTINUED | OUTPATIENT
Start: 2024-02-16 | End: 2024-02-16 | Stop reason: HOSPADM

## 2024-02-16 RX ORDER — HEPARIN SODIUM 1000 [USP'U]/ML
INJECTION, SOLUTION INTRAVENOUS; SUBCUTANEOUS
Status: DISCONTINUED | OUTPATIENT
Start: 2024-02-16 | End: 2024-02-16 | Stop reason: HOSPADM

## 2024-02-16 RX ORDER — POTASSIUM CHLORIDE 1500 MG/1
20 TABLET, EXTENDED RELEASE ORAL
Status: DISCONTINUED | OUTPATIENT
Start: 2024-02-16 | End: 2024-02-16 | Stop reason: HOSPADM

## 2024-02-16 RX ORDER — METOPROLOL TARTRATE 1 MG/ML
5 INJECTION, SOLUTION INTRAVENOUS
Status: DISCONTINUED | OUTPATIENT
Start: 2024-02-16 | End: 2024-02-16 | Stop reason: HOSPADM

## 2024-02-16 RX ORDER — ONDANSETRON 2 MG/ML
4 INJECTION INTRAMUSCULAR; INTRAVENOUS EVERY 6 HOURS PRN
Status: DISCONTINUED | OUTPATIENT
Start: 2024-02-16 | End: 2024-02-16 | Stop reason: HOSPADM

## 2024-02-16 RX ORDER — FAMOTIDINE 20 MG
TABLET ORAL DAILY
COMMUNITY

## 2024-02-16 RX ORDER — ASPIRIN 81 MG/1
81 TABLET, CHEWABLE ORAL ONCE
Status: DISCONTINUED | OUTPATIENT
Start: 2024-02-16 | End: 2024-02-16 | Stop reason: HOSPADM

## 2024-02-16 RX ORDER — NITROGLYCERIN 0.4 MG/1
0.4 TABLET SUBLINGUAL EVERY 5 MIN PRN
Status: DISCONTINUED | OUTPATIENT
Start: 2024-02-16 | End: 2024-02-16 | Stop reason: HOSPADM

## 2024-02-16 RX ADMIN — ATROPINE SULFATE 0.5 MG: 0.1 INJECTION INTRAVENOUS at 13:58

## 2024-02-16 RX ADMIN — SODIUM CHLORIDE: 9 INJECTION, SOLUTION INTRAVENOUS at 08:12

## 2024-02-16 ASSESSMENT — ACTIVITIES OF DAILY LIVING (ADL)
ADLS_ACUITY_SCORE: 35

## 2024-02-16 NOTE — Clinical Note
Max pressure = 12 alfredo. Total duration = 30 seconds. Balloon reinflated a second time: Max pressure = 14 alfredo. Total duration = 18 seconds.

## 2024-02-16 NOTE — PROGRESS NOTES
Pt reminded frequently to keep head resting against mattress, keep both legs straight and still, and to hold pressure to both groins if he sneezes/coughs/laughs while on bedrest.

## 2024-02-16 NOTE — DISCHARGE INSTRUCTIONS
Cardiac Angiogram Discharge Instructions - Femoral    After you go home:    Have an adult stay with you until tomorrow.  Drink extra fluids for 2 days.  You may resume your normal diet.  No smoking       For 24 hours - due to the sedation you received:  Relax and take it easy.  Do NOT make any important or legal decisions.  Do NOT drive or operate machines at home or at work.  Do NOT drink alcohol.    Care of Groin Puncture Site:    For the first 24 hrs - check the puncture site every 1-2 hours while awake.  For 2 days, when you cough, sneeze, laugh or move your bowels, hold your hand over the puncture site and press firmly.  Remove the bandaid after 24 hours. If there is minor oozing, apply another bandaid and remove it after 12 hours.  It is normal to have a small bruise or pea size lump at the site.  You may shower tomorrow. Do NOT take a bath, or use a hot tub or pool for at least 3 days. Do NOT scrub the site. Do not use lotion or powder near the puncture site.    Activity:            For 2 days:  No stooping or squatting  Do NOT do any heavy activity such as exercise, lifting, or straining.   No housework, yard work or any activity that make you sweat  Do NOT lift more than 10 pounds    Bleeding:    If you start bleeding from the site in your groin, lie down flat and press firmly on/above the site for 10 minutes.   Once bleeding stops, lay flat for 2 hours.   Call [facility] as soon as you can.       Call 911 right away if you have heavy bleeding or bleeding that does not stop.      Medicines:    If you are taking an antiplatelet medication such as Plavix, Brilinta or Effient, do not stop taking it until you talk to your cardiologist.    If you are on Metformin (Glucophage), do not restart it until you have blood tests (within 2 to 3 days after discharge).  After you have your blood drawn, you may restart the Metformin.   Take your medications, including blood thinners, unless your provider tells you not to.     If you take Coumadin (Warfarin), have your INR checked by your provider in  3-5 days. Call your clinic to schedule this.  If you have stopped any medicines, check with your provider about when to restart them.    Follow Up Appointments:    Follow up with [facility] Nurse Practitioner at [facility] of patient preference in 7-10 days.    Call the clinic if:    You have increased pain or a large or growing hard lump around the site.  The site is red, swollen, hot or tender.  Blood or fluid is draining from the site.  You have chills or a fever greater than 101 F (38 C).  Your leg feels numb, cool or changes color.  You have hives, a rash or unusual itching.  New pain in the back or belly that you cannot control with Tylenol.  Any questions or concerns.          [facility] at [place]:    [phone] [facility] (7 days a week)

## 2024-02-16 NOTE — PRE-PROCEDURE
GENERAL PRE-PROCEDURE:   Procedure:  Heart catheterization possible intervention  Date/Time:  2/16/2024 10:39 AM    Written consent obtained?: Yes    Risks and benefits: Risks, benefits and alternatives were discussed    Consent given by:  Patient  Patient states understanding of procedure being performed: Yes    Patient's understanding of procedure matches consent: Yes    Procedure consent matches procedure scheduled: Yes    Expected level of sedation:  Moderate  Appropriately NPO:  Yes  ASA Class:  3  Lungs:  Lungs clear with good breath sounds bilaterally  Heart:  Normal heart sounds and rate  History & Physical reviewed:  History and physical reviewed and no updates needed  Statement of review:  I have reviewed the lab findings, diagnostic data, medications, and the plan for sedation  Risk benefit indication for cath poss intervention discussed with pt and geovany (wife)  Discussed dapt renal risk vasc risk mi cva death emergent surgery  All questions answered and they agree to proceed

## 2024-02-16 NOTE — PROGRESS NOTES
Called by RN  Hematoma about 90 min after cath  RN already compressed it and it is soft  Go pulse no  bruits  Dp pulse present  Will hold addn 2 hrs and reassess before discharge

## 2024-02-16 NOTE — PROGRESS NOTES
"Recurring hematoma after holding pressure x45 min, Li SINGH RN from cath lab came to help apply femstop and hold manual pressure laterally to reduce existing hematoma. Increased ecchymosis noted, pt denied flank or back pain.    Pt reported \"I'm not feeling very well\" and \"I feel like I'm gonna barf\" about 10 min after femstop applied, face became pale, HR and BP drop (see flow sheet). Placed in trendelenburg position, O2 applied at 6L, NS fluids running wide open to support BP. Atropine 0.5 mg given. Pt became progressively more pale, reported chest pressure similar to pre-procedure.    RRT called, Dr. Savage at bedside to assess. Plan to bring pt back to cath lab. Transferred on monitor by cath lab staff. Wife in CS room 4, informed of plan.  "

## 2024-02-16 NOTE — PROGRESS NOTES
Dr Savage notified of tennis ball size hematoma. Nurse holding manual pressure. Per Dr Savage - keep pt on 2 further hours of bedrest. Notify if further bleeding issues.

## 2024-02-16 NOTE — PROGRESS NOTES
Care Suites Admission Nursing Note    Patient Information  Name: Anthony Briceno  Age: 73 year old  Reason for admission: coronary angiogram  Care Suites arrival time: 0726    Visitor Information  Name: rj Escamilla     Patient Admission/Assessment   Pre-procedure assessment complete: Yes  If abnormal assessment/labs, provider notified: N/A  NPO: Yes  Medications held per instructions/orders: N/A  Consent: deferred  If applicable, pregnancy test status: deferred  Patient oriented to room: Yes  Education/questions answered: Yes  Plan/other: proceed as planned    Discharge Planning  Discharge name/phone number: Jan [phone]  Overnight post sedation caregiver: same  Discharge location: [place]    Arti Gallo RN

## 2024-02-16 NOTE — Clinical Note
The ECG shows a sinus bradycardia. ECG rate  = 55 bpm. PROVIDER:[TOKEN:[[id number]],FOLLOWUP:[1-3 Days],ESTABLISHEDPATIENT:[T]]

## 2024-02-16 NOTE — PROGRESS NOTES
Care Suites Post Procedure Note    Patient Information  Name: Anthony Briceno  Age: 73 year old    Post Procedure  Time patient returned to Care Suites: 1500  Concerns/abnormal assessment: none, bilateral groin sites stable without evidence of bleeding or hematoma, VSS, pt reports tenderness in R groin, otherwise denies pain  If abnormal assessment, provider notified: N/A  Plan/Other: Recovery from sedation, close monitoring of VS and bilateral groin sites as ordered, discharge education, bedrest x 2 hours, home around 1730 if stable.      Arti Gallo RN

## 2024-02-16 NOTE — PROGRESS NOTES
Care Suites Post Procedure Note    Patient Information  Name: Anthony Briceno  Age: 73 year old    Post Procedure  Time patient returned to Care Suites: 1200  Concerns/abnormal assessment: NA  If abnormal assessment, provider notified: N/A  Plan/Other: Proceed per orders. Pt returned VSS with exception of needing O2 as it dips low. Site to R groin Angio seal CDI, no bleeding or hematoma.    Arti Calhoun RN

## 2024-02-16 NOTE — SIGNIFICANT EVENT
Significant Event Note    Time of event: 2:11 PM    Description of event:  Right Femoral Hematoma with hemodynamic instability  Patient had coronary angiogram via right femoral artery access performed by Dr. Savage. RN reports she was holding manual pressure to the right groin for 45 minutes due to hematoma, then switched to a Fem-stop. Patient became bradycardic and hypotensive shortly after Fem-stop was applied. Prior to RRT team arrival patient was placed in trendelenburg position, infused NS wide-open, and gave 0.5 mg atropine. Dr. Savage evaluated  patient at bedside, and plans to bring patient back to cath lab. Upon arrival BP is 94/73, HR 77. Patient is in trendelenburg position. He is alert and talking. Cath lab RN at bedside preparing patient to return to cath lab. Defer cares to Dr. Savage.  Plan:  Patient is being taken back to cath lab by Dr. Savage to further explore vasculature. STAT hgb and type and screen ordered.     RRT cancelled as patient is being taken back to cath lab.     Christopher Lockwood NP  [facility]  Securely message with the Vocera Web Console (learn more here)  Text page via [facility] Paging/Directory      No charge

## 2024-02-17 NOTE — DISCHARGE SUMMARY
Care Suites Discharge Nursing Note    Patient Information  Name: Anthony Briceno  Age: 73 year old    Discharge Education:  Discharge instructions reviewed: Yes  Additional education/resources provided: AVS  Patient/patient representative verbalizes understanding: Yes  Patient discharging on new medications: Yes  Medication education completed: Yes    Discharge Plans:   Discharge location: home  Discharge ride contacted: Yes  Approximate discharge time: 1830    Discharge Criteria:  Discharge criteria met and vital signs stable: Yes    Patient Belongs:  Patient belongings returned to patient: Yes    Morris Nelson RN      
1.72

## 2024-02-19 ENCOUNTER — TELEPHONE (OUTPATIENT)
Dept: CARDIOLOGY | Facility: CLINIC | Age: 74
End: 2024-02-19
Payer: COMMERCIAL

## 2024-02-19 DIAGNOSIS — I25.10 CAD (CORONARY ARTERY DISEASE): Primary | ICD-10-CM

## 2024-02-19 NOTE — TELEPHONE ENCOUNTER
"Patient was admitted to [facility] on 2/16/24 with past medical history significant for hypertension, gastroesophageal reflux disease who has been recently with complaining of exertional chest pain. He presented to cardiac Cath Lab coronary angiography with possible percutaneous coronary intervention if needed.     2/16/24: Coronary angiogram via RFA resulted in:      Ost Cx to Prox Cx lesion is 35% stenosed.    Ost LAD to Prox LAD lesion is 20% stenosed.    2nd Diag lesion is 80% stenosed.     Left dominant coronary anatomy.  Obstructive coronary disease involving D2.  Successful percutaneous coronary intervention of the D2 lesion with 1 drug-eluting stent-[place] 2.25 X 12 millimeter.  Post dilation with 2.258 NC balloon, max pressure of 15 ROMANA.    Procedure complicated 90 minutes post procedure with hematoma-direct pressure applied. Fem stop placed and pt developed chest pain, hypotension and bradycardia. Atropine given and fem stop removed. RFA hematoma stable but due to ongoing chest pain and hypotension emergency heart cath ordered to r/o problem with diag stent, or bleeding from R fem artery     2/16/24: Repeat coronary angiogram via LFA showed:      Left ventricular filling pressures are normal.     See full angiogram/PCI from this morning  D2 stent widely patent  Abd aortogram into L/R iliac and fem system is normal, no bleed, dissection, narrowing  Stat echo shows  normal LV and no  pericardial effusion  LVEDP 13--normal  Suspect this was vasovagal response to Femstop. No new active problem identified. Pt now  reports feeling  \"back  to normal\"     Pt was started on Brilinta and NTG. PTA ASA continued at time of discharge.    Pt is scheduled for an OV on 2/28/24 at 1450 with PETER Maty Vaughan at our [place] Office.    Order placed for cardiac rehab.    Writer attempted to call pt for a cardiology post discharge phone call, but no answer. VM left to return my call. PELON Farnsworth RN.          "

## 2024-02-20 LAB
ATRIAL RATE - MUSE: 48 BPM
ATRIAL RATE - MUSE: 75 BPM
DIASTOLIC BLOOD PRESSURE - MUSE: NORMAL MMHG
DIASTOLIC BLOOD PRESSURE - MUSE: NORMAL MMHG
INTERPRETATION ECG - MUSE: NORMAL
INTERPRETATION ECG - MUSE: NORMAL
P AXIS - MUSE: 45 DEGREES
P AXIS - MUSE: 48 DEGREES
PR INTERVAL - MUSE: 208 MS
PR INTERVAL - MUSE: 214 MS
QRS DURATION - MUSE: 142 MS
QRS DURATION - MUSE: 152 MS
QT - MUSE: 414 MS
QT - MUSE: 486 MS
QTC - MUSE: 434 MS
QTC - MUSE: 462 MS
R AXIS - MUSE: -84 DEGREES
R AXIS - MUSE: -87 DEGREES
SYSTOLIC BLOOD PRESSURE - MUSE: NORMAL MMHG
SYSTOLIC BLOOD PRESSURE - MUSE: NORMAL MMHG
T AXIS - MUSE: -53 DEGREES
T AXIS - MUSE: 45 DEGREES
VENTRICULAR RATE- MUSE: 48 BPM
VENTRICULAR RATE- MUSE: 75 BPM

## 2024-02-20 NOTE — TELEPHONE ENCOUNTER
Writer returned pt's phone messages.    Called patient to discuss any post hospital d/c questions, review medication changes, and confirm f/u appts. Patient denied any questions regarding new medications or changes to PTA medications.     RN confirmed with patient that he was d/c with an adequate supply of the antiplatelet Brilinta, and reminded of importance of taking without interruption.     Pt has an Rx for PRN SL Nitroglycerin.     Patient denied any SOB, chest pain or light headedness.    RFA and LFA cardiac cath sites are without bleeding, swelling, redness or signs of infection.     RN confirmed with patient that he is scheduled for an OV on 2/28/24 at 1450 with JUAQUIN Maty Vaughan at our [place] Office.     States cardiac rehab has been in contact. Wants to discuss further with Maty during OV.    Patient advised to call clinic with any cardiac related questions or concerns prior to this juaquin't. Patient verbalized understanding and agreed with plan. PELON Farnsworth RN.

## 2024-02-28 ENCOUNTER — OFFICE VISIT (OUTPATIENT)
Dept: CARDIOLOGY | Facility: CLINIC | Age: 74
End: 2024-02-28
Payer: MEDICARE

## 2024-02-28 VITALS
BODY MASS INDEX: 33.65 KG/M2 | HEART RATE: 80 BPM | WEIGHT: 240.4 LBS | HEIGHT: 71 IN | SYSTOLIC BLOOD PRESSURE: 113 MMHG | OXYGEN SATURATION: 93 % | DIASTOLIC BLOOD PRESSURE: 79 MMHG

## 2024-02-28 DIAGNOSIS — E66.811 CLASS 1 DRUG-INDUCED OBESITY WITH SERIOUS COMORBIDITY AND BODY MASS INDEX (BMI) OF 34.0 TO 34.9 IN ADULT: ICD-10-CM

## 2024-02-28 DIAGNOSIS — E66.1 CLASS 1 DRUG-INDUCED OBESITY WITH SERIOUS COMORBIDITY AND BODY MASS INDEX (BMI) OF 34.0 TO 34.9 IN ADULT: ICD-10-CM

## 2024-02-28 DIAGNOSIS — E78.5 HYPERLIPIDEMIA WITH TARGET LDL LESS THAN 70: ICD-10-CM

## 2024-02-28 DIAGNOSIS — I25.10 CORONARY ARTERY DISEASE INVOLVING NATIVE CORONARY ARTERY OF NATIVE HEART WITHOUT ANGINA PECTORIS: Primary | ICD-10-CM

## 2024-02-28 DIAGNOSIS — I10 BENIGN ESSENTIAL HYPERTENSION: ICD-10-CM

## 2024-02-28 PROCEDURE — 99214 OFFICE O/P EST MOD 30 MIN: CPT | Performed by: NURSE PRACTITIONER

## 2024-02-28 RX ORDER — ATORVASTATIN CALCIUM 40 MG/1
40 TABLET, FILM COATED ORAL DAILY
Qty: 90 TABLET | Refills: 3 | Status: SHIPPED | OUTPATIENT
Start: 2024-02-28 | End: 2024-05-01

## 2024-02-28 RX ORDER — FEXOFENADINE HCL 180 MG/1
180 TABLET ORAL PRN
COMMUNITY

## 2024-02-28 NOTE — LETTER
2/28/2024    Felicia Farichild PA-C  [address]  [facility] [address]    RE: Anthony Briceno       Dear Colleague,     I had the pleasure of seeing Anthony Briceno in the [facility].  CARDIOLOGY CLINIC NOTE    PRIMARY CARDIOLOGIST  Dr. Sinclair     PRIMARY CARE PHYSICIAN:  Felicia Fairchild    HISTORY OF PRESENT ILLNESS:  Anthony Briceno is a very pleasant 73-year-old male with a past medical history significant for hypertension, obesity, GERD, prostate cancer, and hypothyroidism.     He was last seen on 2/13/2024 in consultation with Dr. Sinclair for evaluation of chest pain.  Concerning for accelerating angina, he underwent an urgent coronary angiogram that showed an 80% second diagonal stenosis followed by a 2.25 x 12 mm ENMANUEL.  There was mild residual disease to the ostial/proximal circumflex and ostial/proximal LAD.  He was initiated on dual antiplatelet therapy, aspirin and ticagrelor.  His admission was complicated by a right femoral hematoma and recurrent chest pain.  He returned to the Cath Lab which showed a widely patent diagonal stent.  An abdominal aortogram showed right/left iliac and femoral vessels normal without bleeding/dissection, or narrowing.  Echocardiogram showed a normal ejection fraction estimated at 60 to 65%, mild LVH, and no effusion.    He returns to the office today for post hospital follow-up.  He is feeling well on a cardiac standpoint, denies chest pain, shortness of breath, palpitations, PND, orthopnea, presyncope, syncope, and trace bilateral ankle edema.  He has a history of unsteady gait and uses a walking stick.  He denies falling.  Upon exam, lungs are clear bilaterally, heart rate and rhythm regular, resolving ecchymosis to right femoral cath site extending around hip to back.  Hematoma stable without evidence of bruit.    Blood pressure is well-controlled at 113/79  BMP is unremarkable.  Hemoglobin 13.1  Lipid panel showed a total cholesterol of 143,  HDL 53, LDL 69, and triglycerides 115    He has not yet enrolled in cardiac rehab but plans to  Compliant with all medications    PAST MEDICAL HISTORY:  Past Medical History:   Diagnosis Date    Cancer (H)     Gastro-oesophageal reflux disease     High cholesterol     Hypertension     Hypothyroid     Prostate cancer (H)        MEDICATIONS:  Current Outpatient Medications   Medication    ASPIRIN PO    atorvastatin (LIPITOR) 40 MG tablet    Bimatoprost (LUMIGAN OP)    fexofenadine (ALLEGRA) 180 MG tablet    Levothyroxine Sodium (SYNTHROID PO)    loratadine (CLARITIN) 10 MG tablet    meclizine (ANTIVERT) 25 MG tablet    metoprolol succinate ER (TOPROL XL) 25 MG 24 hr tablet    nitroGLYcerin (NITROSTAT) 0.4 MG sublingual tablet    OMEPRAZOLE PO    ticagrelor (BRILINTA) 90 MG tablet    Vitamin D, Cholecalciferol, 25 MCG (1000 UT) CAPS     No current facility-administered medications for this visit.       SOCIAL HISTORY:  I have reviewed this patient's social history and updated it with pertinent information if needed. Anthony VALIENTE Janak  reports that he has never smoked. He has never used smokeless tobacco. He reports current alcohol use. He reports that he does not use drugs.    PHYSICAL EXAM:  Pulse:  [80] 80  BP: (113)/(79) 113/79  SpO2:  [93 %] 93 %  240 lbs 6.4 oz    Constitutional: alert, no distress  Respiratory: Good bilateral air entry  Cardiovascular: Regular rate and rhythm  GI: nondistended  Neuropsychiatric: appropriate affact    ASSESSMENT/PLAN:  Pertinent issues addressed/ reviewed during this cardiology visit  Coronary artery disease -status post coronary angiogram and stenting to the second diagonal using a 2.25 x 12 mm ENMANUEL.  LV well-preserved.  DAPT (Brilinta and aspirin) x 12 months uninterrupted, metoprolol, and statin.  Encourage cardiac rehab, routine exercise, weight loss, and heart healthy diet.  Hypertension -well-controlled  Hyperlipidemia -LDL not at goal, will switch simvastatin to atorvastatin 40  mg daily.  Follow-up fasting lipid panel in 2 months  Obesity -encourage exercise and weight loss    Follow-up in 6 months with Dr. Sinclair or sooner if needed    It was a pleasure seeing this patient in clinic today. Please do not hesitate to contact me with any future questions.     MISA Patel, CNP  Cardiology - [facility]  02/28/2024    Review of the result(s) of each unique test - Last coronary angiogram, echocardiogram, CBC, BMP, lipid panel     The level of medical decision making during this visit was of moderate complexity.    This note was completed in part using dictation via the Dragon voice recognition software. Some word and grammatical errors may occur and must be interpreted in the appropriate clinical context.  If there are any questions pertaining to this issue, please contact me for further clarification.      Thank you for allowing me to participate in the care of your patient.      Sincerely,     MISA Patel CNP     [facility]  cc:   No referring provider defined for this encounter.

## 2024-02-28 NOTE — PATIENT INSTRUCTIONS
Thanks for participating in a office visit with the [facility] clinic today.    You had a coronary angiogram with stent placement. You need to stay on brilinta and aspirin uninterrupted x 12 months, then stop brilinta and aspirin alone indefinitely.   Continue metoprolol  Bad cholesterol not at goal.   Switch simvastatin to atorvastatin 40 mg daily.   Fasting cholesterol level in 2 months.   Encourage exercise and cardiac rehab.       Follow up in 6 months with Dr. Sinclair     Please call my nurse at  [phone] with any questions or concerns.    Scheduling phone number: [phone]  Reminder: Please bring in all current medications, over the counter supplements and vitamin bottles to your next appointment.

## 2024-02-28 NOTE — PROGRESS NOTES
CARDIOLOGY CLINIC NOTE    PRIMARY CARDIOLOGIST  Dr. Sinclair     PRIMARY CARE PHYSICIAN:  Felicia Fairchild    HISTORY OF PRESENT ILLNESS:  Anthony Briceno is a very pleasant 73-year-old male with a past medical history significant for hypertension, obesity, GERD, prostate cancer, and hypothyroidism.     He was last seen on 2/13/2024 in consultation with Dr. Sinclair for evaluation of chest pain.  Concerning for accelerating angina, he underwent an urgent coronary angiogram that showed an 80% second diagonal stenosis followed by a 2.25 x 12 mm ENMANUEL.  There was mild residual disease to the ostial/proximal circumflex and ostial/proximal LAD.  He was initiated on dual antiplatelet therapy, aspirin and ticagrelor.  His admission was complicated by a right femoral hematoma and recurrent chest pain.  He returned to the Cath Lab which showed a widely patent diagonal stent.  An abdominal aortogram showed right/left iliac and femoral vessels normal without bleeding/dissection, or narrowing.  Echocardiogram showed a normal ejection fraction estimated at 60 to 65%, mild LVH, and no effusion.    He returns to the office today for post hospital follow-up.  He is feeling well on a cardiac standpoint, denies chest pain, shortness of breath, palpitations, PND, orthopnea, presyncope, syncope, and trace bilateral ankle edema.  He has a history of unsteady gait and uses a walking stick.  He denies falling.  Upon exam, lungs are clear bilaterally, heart rate and rhythm regular, resolving ecchymosis to right femoral cath site extending around hip to back.  Hematoma stable without evidence of bruit.    Blood pressure is well-controlled at 113/79  BMP is unremarkable.  Hemoglobin 13.1  Lipid panel showed a total cholesterol of 143, HDL 53, LDL 69, and triglycerides 115    He has not yet enrolled in cardiac rehab but plans to  Compliant with all medications    PAST MEDICAL HISTORY:  Past Medical History:   Diagnosis Date    Cancer (H)      Gastro-oesophageal reflux disease     High cholesterol     Hypertension     Hypothyroid     Prostate cancer (H)        MEDICATIONS:  Current Outpatient Medications   Medication    ASPIRIN PO    atorvastatin (LIPITOR) 40 MG tablet    Bimatoprost (LUMIGAN OP)    fexofenadine (ALLEGRA) 180 MG tablet    Levothyroxine Sodium (SYNTHROID PO)    loratadine (CLARITIN) 10 MG tablet    meclizine (ANTIVERT) 25 MG tablet    metoprolol succinate ER (TOPROL XL) 25 MG 24 hr tablet    nitroGLYcerin (NITROSTAT) 0.4 MG sublingual tablet    OMEPRAZOLE PO    ticagrelor (BRILINTA) 90 MG tablet    Vitamin D, Cholecalciferol, 25 MCG (1000 UT) CAPS     No current facility-administered medications for this visit.       SOCIAL HISTORY:  I have reviewed this patient's social history and updated it with pertinent information if needed. Anthony Briceno  reports that he has never smoked. He has never used smokeless tobacco. He reports current alcohol use. He reports that he does not use drugs.    PHYSICAL EXAM:  Pulse:  [80] 80  BP: (113)/(79) 113/79  SpO2:  [93 %] 93 %  240 lbs 6.4 oz    Constitutional: alert, no distress  Respiratory: Good bilateral air entry  Cardiovascular: Regular rate and rhythm  GI: nondistended  Neuropsychiatric: appropriate affact    ASSESSMENT/PLAN:  Pertinent issues addressed/ reviewed during this cardiology visit  Coronary artery disease -status post coronary angiogram and stenting to the second diagonal using a 2.25 x 12 mm ENMANUEL.  LV well-preserved.  DAPT (Brilinta and aspirin) x 12 months uninterrupted, metoprolol, and statin.  Encourage cardiac rehab, routine exercise, weight loss, and heart healthy diet.  Hypertension -well-controlled  Hyperlipidemia -LDL not at goal, will switch simvastatin to atorvastatin 40 mg daily.  Follow-up fasting lipid panel in 2 months  Obesity -encourage exercise and weight loss    Follow-up in 6 months with Dr. Sinclair or sooner if needed    It was a pleasure seeing this patient in clinic  today. Please do not hesitate to contact me with any future questions.     MISA Patel, CNP  Cardiology - [facility]  02/28/2024    Review of the result(s) of each unique test - Last coronary angiogram, echocardiogram, CBC, BMP, lipid panel     The level of medical decision making during this visit was of moderate complexity.    This note was completed in part using dictation via the Dragon voice recognition software. Some word and grammatical errors may occur and must be interpreted in the appropriate clinical context.  If there are any questions pertaining to this issue, please contact me for further clarification.

## 2024-03-15 ENCOUNTER — TELEPHONE (OUTPATIENT)
Dept: CARDIOLOGY | Facility: CLINIC | Age: 74
End: 2024-03-15
Payer: COMMERCIAL

## 2024-03-15 DIAGNOSIS — Z98.890 STATUS POST CORONARY ANGIOGRAM: Primary | ICD-10-CM

## 2024-03-15 RX ORDER — CLOPIDOGREL 300 MG/1
300 TABLET, FILM COATED ORAL ONCE
Qty: 1 TABLET | Refills: 0 | Status: SHIPPED | OUTPATIENT
Start: 2024-03-15 | End: 2024-08-28

## 2024-03-15 RX ORDER — CLOPIDOGREL BISULFATE 75 MG/1
75 TABLET ORAL DAILY
Qty: 90 TABLET | Refills: 3 | Status: SHIPPED | OUTPATIENT
Start: 2024-03-15

## 2024-03-15 NOTE — TELEPHONE ENCOUNTER
Patient S/P ENMANUEL 2/16/24.  Patient states Brilinta is not affordable.  Will route to provider to advise on alternative.  Anupama Baker RN on 3/15/2024 at 4:08 PM

## 2024-03-15 NOTE — TELEPHONE ENCOUNTER
Reviewed with Maty Vaughan, verbal order to transition to Plavix, 300 mg PO loading dose then 75 mg PO daily.  Reviewed instructions with patient by phone who has verbalized understanding.  Anupama Baker RN on 3/15/2024 at 4:42 PM

## 2024-03-18 ENCOUNTER — HOSPITAL ENCOUNTER (OUTPATIENT)
Dept: CARDIAC REHAB | Facility: CLINIC | Age: 74
Discharge: HOME | End: 2024-03-18
Attending: INTERNAL MEDICINE
Payer: MEDICARE

## 2024-03-18 DIAGNOSIS — I25.10 CAD (CORONARY ARTERY DISEASE): ICD-10-CM

## 2024-03-18 PROCEDURE — 93798 PHYS/QHP OP CAR RHAB W/ECG: CPT | Performed by: CLINICAL EXERCISE PHYSIOLOGIST

## 2024-03-20 ENCOUNTER — HOSPITAL ENCOUNTER (OUTPATIENT)
Dept: CARDIAC REHAB | Facility: CLINIC | Age: 74
Discharge: HOME | End: 2024-03-20
Attending: INTERNAL MEDICINE
Payer: MEDICARE

## 2024-03-20 PROCEDURE — 93798 PHYS/QHP OP CAR RHAB W/ECG: CPT | Performed by: REHABILITATION PRACTITIONER

## 2024-03-29 ENCOUNTER — HOSPITAL ENCOUNTER (OUTPATIENT)
Dept: CARDIAC REHAB | Facility: CLINIC | Age: 74
Discharge: HOME | End: 2024-03-29
Attending: INTERNAL MEDICINE
Payer: MEDICARE

## 2024-03-29 PROCEDURE — 93798 PHYS/QHP OP CAR RHAB W/ECG: CPT | Performed by: CLINICAL EXERCISE PHYSIOLOGIST

## 2024-04-01 ENCOUNTER — HOSPITAL ENCOUNTER (OUTPATIENT)
Dept: CARDIAC REHAB | Facility: CLINIC | Age: 74
Discharge: HOME | End: 2024-04-01
Attending: INTERNAL MEDICINE
Payer: MEDICARE

## 2024-04-01 PROCEDURE — 93798 PHYS/QHP OP CAR RHAB W/ECG: CPT

## 2024-04-03 ENCOUNTER — HOSPITAL ENCOUNTER (OUTPATIENT)
Dept: CARDIAC REHAB | Facility: CLINIC | Age: 74
Discharge: HOME | End: 2024-04-03
Attending: INTERNAL MEDICINE
Payer: MEDICARE

## 2024-04-03 PROCEDURE — 93798 PHYS/QHP OP CAR RHAB W/ECG: CPT | Performed by: REHABILITATION PRACTITIONER

## 2024-04-05 ENCOUNTER — HOSPITAL ENCOUNTER (OUTPATIENT)
Dept: CARDIAC REHAB | Facility: CLINIC | Age: 74
Discharge: HOME | End: 2024-04-05
Attending: INTERNAL MEDICINE
Payer: MEDICARE

## 2024-04-05 PROCEDURE — 93798 PHYS/QHP OP CAR RHAB W/ECG: CPT | Performed by: REHABILITATION PRACTITIONER

## 2024-04-08 ENCOUNTER — HOSPITAL ENCOUNTER (OUTPATIENT)
Dept: CARDIAC REHAB | Facility: CLINIC | Age: 74
Discharge: HOME | End: 2024-04-08
Attending: INTERNAL MEDICINE
Payer: MEDICARE

## 2024-04-08 PROCEDURE — 93798 PHYS/QHP OP CAR RHAB W/ECG: CPT | Performed by: REHABILITATION PRACTITIONER

## 2024-04-10 ENCOUNTER — HOSPITAL ENCOUNTER (OUTPATIENT)
Dept: CARDIAC REHAB | Facility: CLINIC | Age: 74
Discharge: HOME | End: 2024-04-10
Attending: INTERNAL MEDICINE
Payer: MEDICARE

## 2024-04-10 PROCEDURE — 93797 PHYS/QHP OP CAR RHAB WO ECG: CPT | Mod: 59 | Performed by: REHABILITATION PRACTITIONER

## 2024-04-10 PROCEDURE — 93798 PHYS/QHP OP CAR RHAB W/ECG: CPT | Performed by: REHABILITATION PRACTITIONER

## 2024-04-12 ENCOUNTER — HOSPITAL ENCOUNTER (OUTPATIENT)
Dept: CARDIAC REHAB | Facility: CLINIC | Age: 74
Discharge: HOME | End: 2024-04-12
Attending: INTERNAL MEDICINE
Payer: MEDICARE

## 2024-04-12 PROCEDURE — 93798 PHYS/QHP OP CAR RHAB W/ECG: CPT | Performed by: REHABILITATION PRACTITIONER

## 2024-04-17 ENCOUNTER — HOSPITAL ENCOUNTER (OUTPATIENT)
Dept: CARDIAC REHAB | Facility: CLINIC | Age: 74
Discharge: HOME | End: 2024-04-17
Attending: INTERNAL MEDICINE
Payer: MEDICARE

## 2024-04-17 PROCEDURE — 93798 PHYS/QHP OP CAR RHAB W/ECG: CPT | Performed by: REHABILITATION PRACTITIONER

## 2024-04-17 PROCEDURE — 93797 PHYS/QHP OP CAR RHAB WO ECG: CPT | Performed by: REHABILITATION PRACTITIONER

## 2024-04-19 ENCOUNTER — HOSPITAL ENCOUNTER (OUTPATIENT)
Dept: CARDIAC REHAB | Facility: CLINIC | Age: 74
Discharge: HOME | End: 2024-04-19
Attending: INTERNAL MEDICINE
Payer: MEDICARE

## 2024-04-19 PROCEDURE — 93798 PHYS/QHP OP CAR RHAB W/ECG: CPT | Performed by: CLINICAL EXERCISE PHYSIOLOGIST

## 2024-04-22 ENCOUNTER — HOSPITAL ENCOUNTER (OUTPATIENT)
Dept: CARDIAC REHAB | Facility: CLINIC | Age: 74
Discharge: HOME | End: 2024-04-22
Attending: INTERNAL MEDICINE
Payer: MEDICARE

## 2024-04-22 PROCEDURE — 93798 PHYS/QHP OP CAR RHAB W/ECG: CPT

## 2024-04-24 ENCOUNTER — HOSPITAL ENCOUNTER (OUTPATIENT)
Dept: CARDIAC REHAB | Facility: CLINIC | Age: 74
Discharge: HOME | End: 2024-04-24
Attending: INTERNAL MEDICINE
Payer: MEDICARE

## 2024-04-24 PROCEDURE — 93798 PHYS/QHP OP CAR RHAB W/ECG: CPT | Performed by: REHABILITATION PRACTITIONER

## 2024-04-24 PROCEDURE — 93797 PHYS/QHP OP CAR RHAB WO ECG: CPT | Performed by: REHABILITATION PRACTITIONER

## 2024-04-29 ENCOUNTER — HOSPITAL ENCOUNTER (OUTPATIENT)
Dept: CARDIAC REHAB | Facility: CLINIC | Age: 74
Discharge: HOME | End: 2024-04-29
Attending: INTERNAL MEDICINE
Payer: MEDICARE

## 2024-04-29 ENCOUNTER — LAB (OUTPATIENT)
Dept: LAB | Facility: CLINIC | Age: 74
End: 2024-04-29
Payer: MEDICARE

## 2024-04-29 DIAGNOSIS — E78.5 HYPERLIPIDEMIA WITH TARGET LDL LESS THAN 70: ICD-10-CM

## 2024-04-29 LAB
ALT SERPL W P-5'-P-CCNC: 15 U/L (ref 0–70)
AST SERPL W P-5'-P-CCNC: 22 U/L (ref 0–45)
CHOLEST SERPL-MCNC: 141 MG/DL
FASTING STATUS PATIENT QL REPORTED: YES
HDLC SERPL-MCNC: 48 MG/DL
LDLC SERPL CALC-MCNC: 68 MG/DL
NONHDLC SERPL-MCNC: 93 MG/DL
TRIGL SERPL-MCNC: 123 MG/DL

## 2024-04-29 PROCEDURE — 84460 ALANINE AMINO (ALT) (SGPT): CPT | Performed by: NURSE PRACTITIONER

## 2024-04-29 PROCEDURE — 93798 PHYS/QHP OP CAR RHAB W/ECG: CPT | Performed by: REHABILITATION PRACTITIONER

## 2024-04-29 PROCEDURE — 80061 LIPID PANEL: CPT | Performed by: NURSE PRACTITIONER

## 2024-04-29 PROCEDURE — 84450 TRANSFERASE (AST) (SGOT): CPT | Performed by: NURSE PRACTITIONER

## 2024-04-29 PROCEDURE — 36415 COLL VENOUS BLD VENIPUNCTURE: CPT | Performed by: NURSE PRACTITIONER

## 2024-04-30 ENCOUNTER — TELEPHONE (OUTPATIENT)
Dept: CARDIOLOGY | Facility: CLINIC | Age: 74
End: 2024-04-30
Payer: COMMERCIAL

## 2024-04-30 DIAGNOSIS — I25.10 CORONARY ARTERY DISEASE INVOLVING NATIVE CORONARY ARTERY OF NATIVE HEART WITHOUT ANGINA PECTORIS: ICD-10-CM

## 2024-04-30 DIAGNOSIS — E78.5 HLD (HYPERLIPIDEMIA): ICD-10-CM

## 2024-04-30 DIAGNOSIS — E78.5 HYPERLIPIDEMIA WITH TARGET LDL LESS THAN 70: Primary | ICD-10-CM

## 2024-04-30 NOTE — TELEPHONE ENCOUNTER
----- Message from MISA Patel CNP sent at 4/30/2024  7:39 AM CDT -----  LDL 68, new guideline are < 55.  Recommend increase atorvastatin to 80 mg daily  Follow up fasting lipid panel in 2 months    Mychart message sent with result. FLP ordered for 2 months

## 2024-05-01 ENCOUNTER — TELEPHONE (OUTPATIENT)
Dept: CARDIOLOGY | Facility: CLINIC | Age: 74
End: 2024-05-01
Payer: COMMERCIAL

## 2024-05-01 ENCOUNTER — HOSPITAL ENCOUNTER (OUTPATIENT)
Dept: CARDIAC REHAB | Facility: CLINIC | Age: 74
Discharge: HOME | End: 2024-05-01
Attending: INTERNAL MEDICINE
Payer: MEDICARE

## 2024-05-01 DIAGNOSIS — I25.10 CORONARY ARTERY DISEASE INVOLVING NATIVE CORONARY ARTERY OF NATIVE HEART WITHOUT ANGINA PECTORIS: ICD-10-CM

## 2024-05-01 DIAGNOSIS — E78.5 HYPERLIPIDEMIA WITH TARGET LDL LESS THAN 70: ICD-10-CM

## 2024-05-01 PROCEDURE — 93798 PHYS/QHP OP CAR RHAB W/ECG: CPT

## 2024-05-01 RX ORDER — ATORVASTATIN CALCIUM 80 MG/1
80 TABLET, FILM COATED ORAL DAILY
Qty: 90 TABLET | Refills: 3 | Status: SHIPPED | OUTPATIENT
Start: 2024-05-01

## 2024-05-01 NOTE — TELEPHONE ENCOUNTER
[facility] Call Center    Phone Message    May a detailed message be left on voicemail: yes     Reason for Call: Other: Pt would like a  call  back to discuss his medication as he had labs a few days ago and was told to increase medication and he would like to discuss and stated that that would be fine for the increase      Action Taken: Other: Cardio    Travel Screening: Not Applicable

## 2024-05-01 NOTE — TELEPHONE ENCOUNTER
Last Maty VALIENTE PETER OV 2-28-24     PRIMARY CARDIOLOGIST  Dr. Sinclair Last OV 2-13-24.     Next Dr. Meraz OV 8-28-24 -     ----- Message from MIKE Adame sent at 5/1/2024  1:21 PM CDT -----  Regarding: rhythm changes  Not sure if I'm sending this to the right team but Rene will be seeing Dr. Meraz in the future.  Today and last session it appears that he maybe going into a 2nd degree block Mobitz vs having Sinus pauses.  I would appreciate if you could look at these strips from todays session and last session.   Questions let me know.     Terry     -----------------------------------------------------------------------------------------------------------------------------------        -------------------------------------------------------------------------------------------

## 2024-05-01 NOTE — TELEPHONE ENCOUNTER
----- Message from MIKE Adame sent at 5/1/2024  1:21 PM CDT -----  Regarding: rhythm changes  Not sure if I'm sending this to the right team but Rene will be seeing Dr. Meraz in the future.  Today and last session it appears that he maybe going into a 2nd degree block Mobitz vs having Sinus pauses.  I would appreciate if you could look at these strips from todays session and last session.   Questions let me know.    Terry

## 2024-05-01 NOTE — TELEPHONE ENCOUNTER
Spoke with patient, per TE 4/30/24 patient to increase Atorvastatin to 80 mg daily.  Patient in agreement and new prescription sent to pharmacy.  Patient has also scheduled follow up labs in 2 months.  Anupama Baker RN on 5/1/2024 at 10:17 AM

## 2024-05-13 ENCOUNTER — HOSPITAL ENCOUNTER (OUTPATIENT)
Dept: CARDIAC REHAB | Facility: CLINIC | Age: 74
Discharge: HOME | End: 2024-05-13
Attending: INTERNAL MEDICINE
Payer: MEDICARE

## 2024-05-13 PROCEDURE — 93798 PHYS/QHP OP CAR RHAB W/ECG: CPT

## 2024-05-15 ENCOUNTER — HOSPITAL ENCOUNTER (OUTPATIENT)
Dept: CARDIAC REHAB | Facility: CLINIC | Age: 74
Discharge: HOME | End: 2024-05-15
Attending: INTERNAL MEDICINE
Payer: MEDICARE

## 2024-05-15 PROCEDURE — 93797 PHYS/QHP OP CAR RHAB WO ECG: CPT | Performed by: REHABILITATION PRACTITIONER

## 2024-05-15 PROCEDURE — 93798 PHYS/QHP OP CAR RHAB W/ECG: CPT | Performed by: REHABILITATION PRACTITIONER

## 2024-05-24 ENCOUNTER — HOSPITAL ENCOUNTER (OUTPATIENT)
Dept: CARDIAC REHAB | Facility: CLINIC | Age: 74
Discharge: HOME | End: 2024-05-24
Attending: INTERNAL MEDICINE
Payer: MEDICARE

## 2024-05-24 PROCEDURE — 93798 PHYS/QHP OP CAR RHAB W/ECG: CPT | Performed by: REHABILITATION PRACTITIONER

## 2024-05-29 ENCOUNTER — HOSPITAL ENCOUNTER (OUTPATIENT)
Dept: CARDIAC REHAB | Facility: CLINIC | Age: 74
Discharge: HOME | End: 2024-05-29
Attending: INTERNAL MEDICINE
Payer: MEDICARE

## 2024-05-29 PROCEDURE — 93798 PHYS/QHP OP CAR RHAB W/ECG: CPT

## 2024-05-31 ENCOUNTER — HOSPITAL ENCOUNTER (OUTPATIENT)
Dept: CARDIAC REHAB | Facility: CLINIC | Age: 74
Discharge: HOME | End: 2024-05-31
Attending: INTERNAL MEDICINE
Payer: MEDICARE

## 2024-05-31 PROCEDURE — 93798 PHYS/QHP OP CAR RHAB W/ECG: CPT

## 2024-06-07 ENCOUNTER — HOSPITAL ENCOUNTER (OUTPATIENT)
Dept: CARDIAC REHAB | Facility: CLINIC | Age: 74
Discharge: HOME | End: 2024-06-07
Attending: INTERNAL MEDICINE
Payer: MEDICARE

## 2024-06-07 PROCEDURE — 93798 PHYS/QHP OP CAR RHAB W/ECG: CPT | Performed by: REHABILITATION PRACTITIONER

## 2024-06-28 ENCOUNTER — LAB (OUTPATIENT)
Dept: LAB | Facility: CLINIC | Age: 74
End: 2024-06-28
Payer: MEDICARE

## 2024-06-28 DIAGNOSIS — E78.5 HLD (HYPERLIPIDEMIA): ICD-10-CM

## 2024-06-28 DIAGNOSIS — I25.10 CORONARY ARTERY DISEASE INVOLVING NATIVE CORONARY ARTERY OF NATIVE HEART WITHOUT ANGINA PECTORIS: ICD-10-CM

## 2024-06-28 LAB
CHOLEST SERPL-MCNC: 139 MG/DL
FASTING STATUS PATIENT QL REPORTED: YES
HDLC SERPL-MCNC: 52 MG/DL
LDLC SERPL CALC-MCNC: 68 MG/DL
NONHDLC SERPL-MCNC: 87 MG/DL
TRIGL SERPL-MCNC: 93 MG/DL

## 2024-06-28 PROCEDURE — 36415 COLL VENOUS BLD VENIPUNCTURE: CPT | Performed by: NURSE PRACTITIONER

## 2024-06-28 PROCEDURE — 80061 LIPID PANEL: CPT | Performed by: NURSE PRACTITIONER

## 2024-08-28 ENCOUNTER — OFFICE VISIT (OUTPATIENT)
Dept: CARDIOLOGY | Facility: CLINIC | Age: 74
End: 2024-08-28
Attending: NURSE PRACTITIONER
Payer: MEDICARE

## 2024-08-28 VITALS
HEIGHT: 71 IN | OXYGEN SATURATION: 95 % | DIASTOLIC BLOOD PRESSURE: 86 MMHG | HEART RATE: 77 BPM | BODY MASS INDEX: 32.97 KG/M2 | SYSTOLIC BLOOD PRESSURE: 108 MMHG | WEIGHT: 235.5 LBS

## 2024-08-28 DIAGNOSIS — I10 BENIGN ESSENTIAL HYPERTENSION: ICD-10-CM

## 2024-08-28 DIAGNOSIS — I25.10 CORONARY ARTERY DISEASE INVOLVING NATIVE CORONARY ARTERY OF NATIVE HEART WITHOUT ANGINA PECTORIS: Primary | ICD-10-CM

## 2024-08-28 DIAGNOSIS — E66.811 CLASS 1 OBESITY DUE TO EXCESS CALORIES WITH SERIOUS COMORBIDITY AND BODY MASS INDEX (BMI) OF 33.0 TO 33.9 IN ADULT: ICD-10-CM

## 2024-08-28 DIAGNOSIS — E78.5 HYPERLIPIDEMIA WITH TARGET LDL LESS THAN 70: ICD-10-CM

## 2024-08-28 DIAGNOSIS — E66.09 CLASS 1 OBESITY DUE TO EXCESS CALORIES WITH SERIOUS COMORBIDITY AND BODY MASS INDEX (BMI) OF 33.0 TO 33.9 IN ADULT: ICD-10-CM

## 2024-08-28 PROCEDURE — G2211 COMPLEX E/M VISIT ADD ON: HCPCS | Performed by: INTERNAL MEDICINE

## 2024-08-28 PROCEDURE — 99214 OFFICE O/P EST MOD 30 MIN: CPT | Performed by: INTERNAL MEDICINE

## 2024-08-28 RX ORDER — EZETIMIBE 10 MG/1
10 TABLET ORAL DAILY
Qty: 90 TABLET | Refills: 4 | Status: SHIPPED | OUTPATIENT
Start: 2024-08-28

## 2024-08-28 NOTE — LETTER
8/28/2024    Felicia Fairchild PA-C  [address]  [facility] [address]    RE: Anthony Briceno       Dear Colleague,     I had the pleasure of seeing Anthony Briceno in the [facility].  HPI and Plan:   Mr. Briceno is a very nice 74-year-old retired  with past medical history significant for coronary artery disease, hypertension, hyperlipidemia, obesity, GERD, prostate cancer and hypothyroidism.    Coronary history dates back to last winter when he experienced about 3 to 4 months of increased burping and epigastric fullness.  This ultimately led to stenting of his second diagonal in February 2024.  Unfortunately procedure was complicated by a right femoral hematoma.  He had mild other disease.  Ticagrelor was too expensive and he transitioned to clopidogrel.  He notes some increased ecchymoses on his left forearm.  He has not had no further belching or epigastric discomfort.  He participate in cardiac rehab but has no formal exercise regiment.  He is not following a very strict diet.    Family history is significant for his father having a myocardial infarction at age 56 ultimately dying at 79.  There is no vascular history on the mother side of the family.  He is a lifelong non-smoker.  Rarely drinks alcohol.  Does not have diabetes mellitus.    He has no chest, arm, neck, jaw or shoulder discomfort.  No dyspnea on exertion, orthopnea, or PND.  No palpitations, lightheadedness, dizziness, syncope, or near syncope.  No peripheral edema.  He notes no side effects or problems with his current medical regimen.    Assessment and plan.  Anthony has no symptoms to suggest ischemia, heart failure or significant arrhythmia.  He did have a follow-up echocardiogram in February a which describes mild concentric left ventricular hypertrophy with ejection fraction of 60 to 65% without focal wall motion abnormalities.  He had no significant valvular pathology.  There was some question as to whether  his RV may be dilated but it was an off axis view.    At this time I recommend that he can stop his aspirin and continue on clopidogrel.  Multiple recent meta-analysis shows long-term data Plavix is probably the better choice than aspirin and I have told him we will plan on lifelong clopidogrel.  Hopefully his forearm ecchymoses improved with discontinue aspirin.    Blood pressure is very well-controlled at 108/86 with a pulse of 77.    Weight is 235 pounds giving a body mass index of 33.3.  We talked about the importance of a regular exercise regimen to include aerobic activity and resistance activity.  Also hopefully weight loss.  We talked about eating a predominantly plant-based diet and he is not too enthusiastic about this.    Fasting lipid profile is still not to goal despite being on 80 mg of atorvastatin.  Total cholesterol 139, HDL 52, LDL 68 triglycerides of 93.  I will add Zetia 10 mg to his regimen to try to drive his LDL down to 55 or lower.  Again we talked about nonmedicinal things he can do to further lower his cholesterol.    As stated I will follow-up blood work in 1 month with the initiation of Zetia.  I will have him follow-up with my PETER in 6 months and he will establish with a new cardiologist in 1 year.  If he should have any problems would be glad to see him sooner. Thank you for allow me to participate in this patient's care.  Sincerely,                               Brian Meraz MD [facility]    The longitudinal plan of care for the diagnosis(es)/condition(s) as documented were addressed during this visit. Due to the added complexity in care, I will continue to support Anthony in the subsequent management and with ongoing continuity of care.    I reviewed his coronary angiogram, echocardiogram, lab work  Today's clinic visit entailed:  Review of external notes as documented elsewhere in note  Ordering of each unique test  Prescription drug management  35 minutes spent by me on the date of the  encounter doing chart review, history and exam, documentation and further activities per the note  Provider  Link to MDM Help Grid     The level of medical decision making during this visit was of moderate complexity.      Orders Placed This Encounter   Procedures     Basic metabolic panel     Lipid Profile     ALT     Lipid Profile     ALT     Follow-Up with Cardiology     Follow-Up with Cardiology PETER       Orders Placed This Encounter   Medications     ezetimibe (ZETIA) 10 MG tablet     Sig: Take 1 tablet (10 mg) by mouth daily.     Dispense:  90 tablet     Refill:  4       Medications Discontinued During This Encounter   Medication Reason     clopidogrel (PLAVIX) 300 MG TABS tablet      ASPIRIN PO          Encounter Diagnoses   Name Primary?     Coronary artery disease involving native coronary artery of native heart without angina pectoris Yes     Hyperlipidemia with target LDL less than 70      Benign essential hypertension      Class 1 obesity due to excess calories with serious comorbidity and body mass index (BMI) of 33.0 to 33.9 in adult        CURRENT MEDICATIONS:  Current Outpatient Medications   Medication Sig Dispense Refill     atorvastatin (LIPITOR) 80 MG tablet Take 1 tablet (80 mg) by mouth daily 90 tablet 3     Bimatoprost (LUMIGAN OP) One drop in each eye daily       clopidogrel (PLAVIX) 75 MG tablet Take 1 tablet (75 mg) by mouth daily To start the day after taking 300 mg loading dose. 90 tablet 3     ezetimibe (ZETIA) 10 MG tablet Take 1 tablet (10 mg) by mouth daily. 90 tablet 4     fexofenadine (ALLEGRA) 180 MG tablet Take 180 mg by mouth as needed for allergies       Levothyroxine Sodium (SYNTHROID PO) Take 150 mcg by mouth daily       loratadine (CLARITIN) 10 MG tablet Take 10 mg by mouth daily       meclizine (ANTIVERT) 25 MG tablet Take 1 tablet (25 mg) by mouth 4 times daily as needed for dizziness 30 tablet 0     metoprolol succinate ER (TOPROL XL) 25 MG 24 hr tablet Take 1 tablet by  mouth daily at 2 pm       nitroGLYcerin (NITROSTAT) 0.4 MG sublingual tablet For chest pain place 1 tablet under the tongue every 5 minutes for 3 doses. If symptoms persist 5 minutes after 1st dose call 911. 15 tablet 1     OMEPRAZOLE PO Take 40 mg by mouth every morning        Vitamin D, Cholecalciferol, 25 MCG (1000 UT) CAPS Take by mouth daily         ALLERGIES     Allergies   Allergen Reactions     Ancef [Cefazolin Sodium]      anaphyllaxis       PAST MEDICAL HISTORY:  Past Medical History:   Diagnosis Date     Cancer (H)      Gastro-oesophageal reflux disease      High cholesterol      Hypertension      Hypothyroid      Prostate cancer (H)        PAST SURGICAL HISTORY:  Past Surgical History:   Procedure Laterality Date     APPENDECTOMY       COLONOSCOPY       CV ABDOMINAL ANGIOGRAM N/A 2/16/2024    Procedure: Aortogram Abdominal;  Surgeon: Mukul Savage MD;  Location: [facility] CARDIAC CATH LAB     CV CORONARY ANGIOGRAM N/A 2/16/2024    Procedure: Coronary Angiogram;  Surgeon: Mukul Savage MD;  Location: [facility] CARDIAC CATH LAB     CV CORONARY ANGIOGRAM N/A 2/16/2024    Procedure: Coronary Angiogram;  Surgeon: Mukul Savage MD;  Location: [facility] CARDIAC CATH LAB     CV LEFT HEART CATH N/A 2/16/2024    Procedure: Left Heart Catheterization;  Surgeon: Mukul Savage MD;  Location: [facility] CARDIAC CATH LAB     CV PCI N/A 2/16/2024    Procedure: Percutaneous Coronary Intervention;  Surgeon: Mukul Savage MD;  Location: [facility] CARDIAC CATH LAB     GENITOURINARY SURGERY       HERNIA REPAIR       PROSTATE SURGERY  2009       FAMILY HISTORY:  History reviewed. No pertinent family history.    SOCIAL HISTORY:  Social History     Socioeconomic History     Marital status:      Spouse name: None     Number of children: None     Years of education: None     Highest education level: None   Tobacco Use     Smoking status: Never     Smokeless tobacco: Never   Vaping Use     Vaping status:  "Never Used   Substance and Sexual Activity     Alcohol use: Yes     Comment: specal occ. only     Drug use: No       Review of Systems:  Skin:  Positive for bruising     Eyes:  Negative      ENT:  Negative      Respiratory:  Positive for dyspnea on exertion     Cardiovascular:  Negative      Gastroenterology: Negative      Genitourinary:  Negative      Musculoskeletal:  Positive for   hips  Neurologic:  Negative      Psychiatric:  Negative      Heme/Lymph/Imm:  Positive for easy bruising    Endocrine:  Negative        Physical Exam:  Vitals: /86   Pulse 77   Ht 1.791 m (5' 10.5\")   Wt 106.8 kg (235 lb 8 oz)   SpO2 95%   BMI 33.31 kg/m      Constitutional:  cooperative, alert and oriented, well developed, well nourished, in no acute distress obese      Skin:  warm and dry to the touch, no apparent skin lesions or masses noted          Head:  normocephalic, no masses or lesions        Eyes:  pupils equal and round, conjunctivae and lids unremarkable, sclera white, no xanthalasma, EOMS intact, no nystagmus        Lymph:      ENT:  no pallor or cyanosis, dentition good        Neck:  no carotid bruit        Respiratory:  normal breath sounds, clear to auscultation, normal A-P diameter, normal symmetry, normal respiratory excursion, no use of accessory muscles         Cardiac: regular rhythm;no murmurs, gallops or rubs detected frequent premature beats              pulses full and equal                                        GI:           Extremities and Muscular Skeletal:  no edema;no spinal abnormalities noted;normal muscle strength and tone              Neurological:  no gross motor deficits        Psych:  affect appropriate, oriented to time, person and place        SB Vaughan, MISA CNP  [address] [place] [address]                  Thank you for allowing me to participate in the care of your patient.      Sincerely,     Brian Meraz MD     [facility] " [facility]  cc:   Maty Vaughan, APRN CNP  [address] [place] [address]

## 2024-09-04 ENCOUNTER — LAB (OUTPATIENT)
Dept: LAB | Facility: CLINIC | Age: 74
End: 2024-09-04
Payer: MEDICARE

## 2024-09-04 DIAGNOSIS — I25.10 CORONARY ARTERY DISEASE INVOLVING NATIVE CORONARY ARTERY OF NATIVE HEART WITHOUT ANGINA PECTORIS: ICD-10-CM

## 2024-09-04 LAB — ALT SERPL W P-5'-P-CCNC: 17 U/L (ref 0–70)

## 2024-09-04 PROCEDURE — 36415 COLL VENOUS BLD VENIPUNCTURE: CPT

## 2024-09-04 PROCEDURE — 84460 ALANINE AMINO (ALT) (SGPT): CPT

## 2024-09-05 ENCOUNTER — TELEPHONE (OUTPATIENT)
Dept: CARDIOLOGY | Facility: CLINIC | Age: 74
End: 2024-09-05
Payer: COMMERCIAL

## 2024-09-05 DIAGNOSIS — I25.10 CORONARY ARTERY DISEASE INVOLVING NATIVE CORONARY ARTERY OF NATIVE HEART WITHOUT ANGINA PECTORIS: Primary | ICD-10-CM

## 2024-09-05 DIAGNOSIS — E78.5 HYPERLIPIDEMIA WITH TARGET LDL LESS THAN 70: ICD-10-CM

## 2024-09-05 NOTE — TELEPHONE ENCOUNTER
ALT completed as below, not due for another month, to be done with FLP after adding zetia 10mg daily. Left a message for patient to call back and discuss. New ALT order placed.       Component      Latest Ref Rng 9/4/2024  10:46 AM   ALT      0 - 70 U/L 17

## 2024-09-06 NOTE — TELEPHONE ENCOUNTER
Spoke with patient to review baseline ALT. He will schedule the fl/alt with the SAC after being on the zetia for 30+ days.

## 2024-10-02 ENCOUNTER — LAB (OUTPATIENT)
Dept: LAB | Facility: CLINIC | Age: 74
End: 2024-10-02
Payer: MEDICARE

## 2024-10-02 DIAGNOSIS — I25.10 CORONARY ARTERY DISEASE INVOLVING NATIVE CORONARY ARTERY OF NATIVE HEART WITHOUT ANGINA PECTORIS: ICD-10-CM

## 2024-10-02 DIAGNOSIS — E78.5 HYPERLIPIDEMIA WITH TARGET LDL LESS THAN 70: ICD-10-CM

## 2024-10-02 LAB
ALT SERPL W P-5'-P-CCNC: 11 U/L (ref 0–70)
CHOLEST SERPL-MCNC: 119 MG/DL
FASTING STATUS PATIENT QL REPORTED: YES
HDLC SERPL-MCNC: 47 MG/DL
LDLC SERPL CALC-MCNC: 52 MG/DL
NONHDLC SERPL-MCNC: 72 MG/DL
TRIGL SERPL-MCNC: 98 MG/DL

## 2024-10-02 PROCEDURE — 36415 COLL VENOUS BLD VENIPUNCTURE: CPT

## 2024-10-02 PROCEDURE — 80061 LIPID PANEL: CPT

## 2024-10-02 PROCEDURE — 84460 ALANINE AMINO (ALT) (SGPT): CPT

## 2024-10-03 ENCOUNTER — TELEPHONE (OUTPATIENT)
Dept: CARDIOLOGY | Facility: CLINIC | Age: 74
End: 2024-10-03
Payer: COMMERCIAL

## 2024-10-03 NOTE — TELEPHONE ENCOUNTER
Brian Meraz MD Anderson, Willow OSPINA, RN38 minutes ago (3:59 PM)     Looks Good thanks       Called patient, reviewed labs and plan to continue with current medications. PETER follow up is scheduled for February.

## 2024-10-03 NOTE — TELEPHONE ENCOUNTER
Lipid panel 10/2/2024 noted. Ordered for 30 day follow up after adding zetia 10mg daily to lipitor 80mg.    Component      Latest Ref Rng 6/28/2024  9:28 AM 10/2/2024  10:45 AM   Cholesterol      <200 mg/dL 139  119    Triglycerides      <150 mg/dL 93  98    HDL Cholesterol      >=40 mg/dL 52  47    LDL Cholesterol Calculated      <100 mg/dL 68  52    Non HDL Cholesterol      <130 mg/dL 87  72    Patient Fasting? Yes  Yes    AST      0 - 45 U/L     ALT      0 - 70 U/L  11      Will message Dr. Meraz to review

## 2024-12-04 ENCOUNTER — TELEPHONE (OUTPATIENT)
Dept: CARDIOLOGY | Facility: CLINIC | Age: 74
End: 2024-12-04
Payer: COMMERCIAL

## 2024-12-04 NOTE — TELEPHONE ENCOUNTER
1st attempt- Left voicemail for the patient to call back and schedule the following:    Appointment type:  Return Cardiology  Provider:  Maty VALIENTE  Return date:  2/27/25  Additional appointment(s) needed:  orphaned appt- Maty VALIENTE is not seeing pts in [place] in spring 2025- pt can r/s to see Maty VALIENTE in  or Pt can see any general PETER in [place]- MK   Additonal Notes:  [place] sent too  Specialty phone number: [phone]    Severe and needs follow up Ortho.

## 2024-12-12 DIAGNOSIS — Z98.890 STATUS POST CORONARY ANGIOGRAM: ICD-10-CM

## 2024-12-12 RX ORDER — CLOPIDOGREL BISULFATE 75 MG/1
75 TABLET ORAL DAILY
Qty: 90 TABLET | Refills: 2 | Status: SHIPPED | OUTPATIENT
Start: 2024-12-12

## 2025-02-11 ENCOUNTER — EXTERNAL ORDER RESULTS (OUTPATIENT)
Dept: LAB | Facility: CLINIC | Age: 75
End: 2025-02-11

## 2025-02-13 DIAGNOSIS — E78.5 HYPERLIPIDEMIA WITH TARGET LDL LESS THAN 70: ICD-10-CM

## 2025-02-13 DIAGNOSIS — I25.10 CORONARY ARTERY DISEASE INVOLVING NATIVE CORONARY ARTERY OF NATIVE HEART WITHOUT ANGINA PECTORIS: ICD-10-CM

## 2025-02-13 RX ORDER — ATORVASTATIN CALCIUM 80 MG/1
80 TABLET, FILM COATED ORAL DAILY
Qty: 90 TABLET | Refills: 1 | Status: SHIPPED | OUTPATIENT
Start: 2025-02-13

## 2025-02-27 ENCOUNTER — OFFICE VISIT (OUTPATIENT)
Dept: CARDIOLOGY | Facility: CLINIC | Age: 75
End: 2025-02-27
Attending: INTERNAL MEDICINE
Payer: MEDICARE

## 2025-02-27 VITALS
HEART RATE: 68 BPM | SYSTOLIC BLOOD PRESSURE: 108 MMHG | DIASTOLIC BLOOD PRESSURE: 74 MMHG | HEIGHT: 71 IN | WEIGHT: 248.9 LBS | OXYGEN SATURATION: 96 % | BODY MASS INDEX: 34.85 KG/M2

## 2025-02-27 DIAGNOSIS — I10 BENIGN ESSENTIAL HYPERTENSION: ICD-10-CM

## 2025-02-27 DIAGNOSIS — I25.10 CORONARY ARTERY DISEASE INVOLVING NATIVE CORONARY ARTERY OF NATIVE HEART WITHOUT ANGINA PECTORIS: Primary | ICD-10-CM

## 2025-02-27 DIAGNOSIS — E66.811 CLASS 1 OBESITY DUE TO EXCESS CALORIES WITH SERIOUS COMORBIDITY AND BODY MASS INDEX (BMI) OF 33.0 TO 33.9 IN ADULT: ICD-10-CM

## 2025-02-27 DIAGNOSIS — E78.5 HYPERLIPIDEMIA WITH TARGET LDL LESS THAN 70: ICD-10-CM

## 2025-02-27 DIAGNOSIS — E66.09 CLASS 1 OBESITY DUE TO EXCESS CALORIES WITH SERIOUS COMORBIDITY AND BODY MASS INDEX (BMI) OF 33.0 TO 33.9 IN ADULT: ICD-10-CM

## 2025-02-27 RX ORDER — LEVOTHYROXINE SODIUM 137 UG/1
1 TABLET ORAL
COMMUNITY
Start: 2025-02-12

## 2025-02-27 NOTE — PATIENT INSTRUCTIONS
Today's Recommendations    Your cholesterol looks great  Your blood pressure is good   I recommend you resume your routine exercise regimen to work on weight loss  You can try wearing compression stockings during the day, knee high preferred, watch your salt intake and elevate your legs   Continue all medications without changes.  Please follow up with a new cardiologist in 6 months.  6. I work Dr. Anderson, Dr. Sanchez, Dr. Montalvo, Dr. Pacheco, and Dr. Ayala     Please send a emoquo message or call [phone] to the RN team with questions or concerns.     Scheduling number [phone]  MISA Alvarez, CNP

## 2025-02-27 NOTE — LETTER
2/27/2025    Felicia Fairchild PA-C  [address] [facility] [address]  [facility] [address]    RE: Anthony Briceno       Dear Colleague,     I had the pleasure of seeing Anthony Briceno in the [facility].    Cardiology Clinic Progress Note  Anthony Briceno MRN# [id number]   YOB: 1950 Age: 74 year old   Primary Cardiologist: Dr. Meraz  Reason for visit: 6 month follow up             Assessment and Plan:       1.  Coronary artery disease, s/p stenting of second diagonal (2/2024)  -Lifelong Plavix monotherapy  -No anginal symptoms    2.  Hyperlipidemia, LDL at goal  -10/2024 lipid profile with LDL 52 with normal ALT following the addition of Zetia    3.  Hypertension, well-controlled    Rene continues to feel well without any symptoms suggestive of angina or heart failure.  His lipid profile the addition of Zetia.  His hypertension is well-controlled with metoprolol.  He is tolerating his Plavix without any significant bleeding problems.  We did discuss the importance of working on weight loss as he has gained a relatively large amount of weight over the last due to dietary indiscretions.  I encouraged him to work on exercise and dietary modifications.  He does have a mild to moderate amount of ankle edema. I encouraged him to wear daily compression stockings, elevate his legs, and monitor his salt intake.  He can follow-up with a new cardiologist in 6 months with a fasting lipid profile prior.    Plan:  Continue clopidogrel 75mg daily   Continue atorvastatin 80 mg daily and Zetia 10 mg daily  Continue metoprolol XL 25 mg daily    Follow up: Follow up with a new cardiologist in 6 months         History of Presenting Illness:    Anthony Briceno is a very pleasant 74 year old male with a history of coronary artery disease, hypertension, hyperlipidemia, obesity, GERD, prostate cancer and hypothyroidism.     Patient's coronary disease history dates back to winter 2023 when he experienced 3 to 4 months of  increased burping and epigastric fullness.  He was ultimately led to stenting of the second diagonal in February 2024.  He had mild disease elsewhere.  He had an echocardiogram at that time which showed normal left ventricular ejection fraction with mild concentric left ventricular hypertrophy, no wall motion abnormalities, no significant valvular pathology.    He was seen last by Dr. Meraz in 2024 at which time he was feeling well without any concerning symptoms.  At that time he was transitioned to Plavix monotherapy and added to his regimen.    Patient is here today for a follow up of his coronary artery disease.     Patient reports feeling good.  Denies any epigastric pain or fullness.Patient denies chest pain or chest tightness. Denies dizziness, lightheadedness or other presyncopal symptoms. Denies tachycardia or palpitations.  Denies orthopnea or PND.    Most recent labs from 10/2/24 show total cholesterol 119, HDL 47, 52, triglycerides 98, ALT 11.     Blood pressure 108/74 and HR 68 in clinic today. Denies any bleeding issues with plavix.     He has gained 13 lbs since the last visit. Notes he had dietary indiscretions over the holidays. He has not been exercising since the weather has been cold.  He notes he is a little short of breath with exertion, unchanged for several years.        Recent Hospitalizations   None in [id number]           Social History      Social History     Socioeconomic History     Marital status:      Spouse name: Not on file     Number of children: Not on file     Years of education: Not on file     Highest education level: Not on file   Occupational History     Not on file   Tobacco Use     Smoking status: Never     Smokeless tobacco: Never   Vaping Use     Vaping status: Never Used   Substance and Sexual Activity     Alcohol use: Yes     Comment: specal occ. only     Drug use: No     Sexual activity: Not on file   Other Topics Concern     Not on file   Social History  "Narrative     Not on file     Social Drivers of Health     Financial Resource Strain: Not on file   Food Insecurity: Not on file   Transportation Needs: Not on file   Physical Activity: Not on file   Stress: Not on file   Social Connections: Not on file   Interpersonal Safety: Not on file   Housing Stability: Not on file            Review of Systems:   Skin:        Eyes:       ENT:       Respiratory:  Positive for dyspnea on exertion  Cardiovascular:  Negative    Gastroenterology:      Genitourinary:       Musculoskeletal:       Neurologic:       Psychiatric:       Heme/Lymph/Imm:       Endocrine:              Physical Exam:   Vitals: /74   Pulse 68   Ht 1.791 m (5' 10.5\")   Wt 112.9 kg (248 lb 14.4 oz)   SpO2 96%   BMI 35.21 kg/m     Wt Readings from Last 4 Encounters:   02/27/25 112.9 kg (248 lb 14.4 oz)   08/28/24 106.8 kg (235 lb 8 oz)   02/28/24 109 kg (240 lb 6.4 oz)   02/16/24 111.1 kg (245 lb)     GEN: well nourished, in no acute distress.  HEENT:  Pupils equal, round. Sclerae nonicteric.   NECK: Supple, no masses appreciated. No JVD with patient supine.  C/V:  Regular rate and rhythm, no murmur, rub or gallop.    RESP: Respirations are unlabored. Clear to auscultation bilaterally without wheezing, rales, or rhonchi.  GI: Abdomen soft, nontender.  EXTREM: Bilateral ankle edema.  NEURO: Alert and oriented, cooperative.  SKIN: Warm and dry.        Data:     LIPID RESULTS:  Lab Results   Component Value Date    CHOL 119 10/02/2024    HDL 47 10/02/2024    LDL 52 10/02/2024    TRIG 98 10/02/2024    TRIG 115 02/09/2024     LIVER ENZYME RESULTS:  Lab Results   Component Value Date    AST 22 04/29/2024    AST 22 12/14/2013    ALT 11 10/02/2024    ALT 28 12/14/2013     CBC RESULTS:  Lab Results   Component Value Date    WBC 5.7 02/16/2024    WBC 9.1 07/26/2016    RBC 4.42 02/16/2024    RBC 4.07 (L) 07/26/2016    HGB 13.1 (L) 02/16/2024    HGB 13.6 07/26/2016    HCT 43.1 02/16/2024    HCT 39.6 (L) " "07/26/2016    MCV 98 02/16/2024    MCV 97 07/26/2016    MCH 33.3 (H) 02/16/2024    MCH 33.4 (H) 07/26/2016    MCHC 34.1 02/16/2024    MCHC 34.3 07/26/2016    RDW 13.2 02/16/2024    RDW 13.3 07/26/2016     02/16/2024     07/26/2016     BMP RESULTS:  Lab Results   Component Value Date     02/16/2024     07/26/2016    POTASSIUM 4.1 02/16/2024    POTASSIUM 4.7 07/31/2021    POTASSIUM 3.8 07/26/2016    CHLORIDE 103 02/16/2024    CHLORIDE 103 02/09/2024    CHLORIDE 108 07/26/2016    CO2 29 02/16/2024    CO2 31 07/31/2021    CO2 25 07/26/2016    ANIONGAP 7 02/16/2024    ANIONGAP 2 (L) 07/31/2021    ANIONGAP 8 07/26/2016     (H) 02/16/2024     (H) 07/31/2021     (H) 07/26/2016    BUN 14.1 02/16/2024    BUN 13 07/31/2021    BUN 12 07/26/2016    CR 1.08 02/16/2024    CR 1.09 07/26/2016    GFRESTIMATED 72 02/16/2024    GFRESTIMATED 67 07/26/2016    GFRESTBLACK 82 07/26/2016    CAPRI 9.1 02/16/2024    CAPRI 9.5 07/26/2016      A1C RESULTS:  No results found for: \"A1C\"  INR RESULTS:  Lab Results   Component Value Date    INR 0.96 02/16/2024    INR 0.97 04/03/2006            Medications     Current Outpatient Medications   Medication Sig Dispense Refill     atorvastatin (LIPITOR) 80 MG tablet Take 1 tablet (80 mg) by mouth daily. 90 tablet 1     Bimatoprost (LUMIGAN OP) One drop in each eye daily       clopidogrel (PLAVIX) 75 MG tablet Take 1 tablet (75 mg) by mouth daily. 90 tablet 2     ezetimibe (ZETIA) 10 MG tablet Take 1 tablet (10 mg) by mouth daily. 90 tablet 4     fexofenadine (ALLEGRA) 180 MG tablet Take 180 mg by mouth as needed for allergies       levothyroxine (SYNTHROID/LEVOTHROID) 137 MCG tablet Take 1 tablet by mouth daily at 2 pm.       loratadine (CLARITIN) 10 MG tablet Take 10 mg by mouth daily       meclizine (ANTIVERT) 25 MG tablet Take 1 tablet (25 mg) by mouth 4 times daily as needed for dizziness 30 tablet 0     metoprolol succinate ER (TOPROL XL) 25 MG 24 hr " tablet Take 1 tablet by mouth daily at 2 pm       nitroGLYcerin (NITROSTAT) 0.4 MG sublingual tablet For chest pain place 1 tablet under the tongue every 5 minutes for 3 doses. If symptoms persist 5 minutes after 1st dose call 911. 15 tablet 1     OMEPRAZOLE PO Take 40 mg by mouth every morning        Vitamin D, Cholecalciferol, 25 MCG (1000 UT) CAPS Take by mouth daily            Past Medical History     Past Medical History:   Diagnosis Date     Cancer (H)      Gastro-oesophageal reflux disease      High cholesterol      Hypertension      Hypothyroid      Prostate cancer (H)      Past Surgical History:   Procedure Laterality Date     APPENDECTOMY       COLONOSCOPY       CV ABDOMINAL ANGIOGRAM N/A 2/16/2024    Procedure: Aortogram Abdominal;  Surgeon: Mukul Savage MD;  Location: [facility] CARDIAC CATH LAB     CV CORONARY ANGIOGRAM N/A 2/16/2024    Procedure: Coronary Angiogram;  Surgeon: Mukul Savage MD;  Location: [facility] CARDIAC CATH LAB     CV CORONARY ANGIOGRAM N/A 2/16/2024    Procedure: Coronary Angiogram;  Surgeon: Mukul Savage MD;  Location: [facility] CARDIAC CATH LAB     CV LEFT HEART CATH N/A 2/16/2024    Procedure: Left Heart Catheterization;  Surgeon: Mukul Savage MD;  Location: [facility] CARDIAC CATH LAB     CV PCI N/A 2/16/2024    Procedure: Percutaneous Coronary Intervention;  Surgeon: Mukul Savage MD;  Location: [facility] CARDIAC CATH LAB     GENITOURINARY SURGERY       HERNIA REPAIR       PROSTATE SURGERY  2009     History reviewed. No pertinent family history.         Allergies   Ancef [cefazolin sodium]        Micki Choi NP  [facility]       Thank you for allowing me to participate in the care of your patient.      Sincerely,     Micki Choi NP     [facility]  cc:   Brian Meraz MD  [address] [address]

## 2025-02-27 NOTE — PROGRESS NOTES
Cardiology Clinic Progress Note  Anthony Briceno MRN# [id number]   YOB: 1950 Age: 74 year old   Primary Cardiologist: Dr. Meraz  Reason for visit: 6 month follow up             Assessment and Plan:       1.  Coronary artery disease, s/p stenting of second diagonal (2/2024)  -Lifelong Plavix monotherapy  -No anginal symptoms    2.  Hyperlipidemia, LDL at goal  -10/2024 lipid profile with LDL 52 with normal ALT following the addition of Zetia    3.  Hypertension, well-controlled    Rene continues to feel well without any symptoms suggestive of angina or heart failure.  His lipid profile the addition of Zetia.  His hypertension is well-controlled with metoprolol.  He is tolerating his Plavix without any significant bleeding problems.  We did discuss the importance of working on weight loss as he has gained a relatively large amount of weight over the last due to dietary indiscretions.  I encouraged him to work on exercise and dietary modifications.  He does have a mild to moderate amount of ankle edema. I encouraged him to wear daily compression stockings, elevate his legs, and monitor his salt intake.  He can follow-up with a new cardiologist in 6 months with a fasting lipid profile prior.    Plan:  Continue clopidogrel 75mg daily   Continue atorvastatin 80 mg daily and Zetia 10 mg daily  Continue metoprolol XL 25 mg daily    Follow up: Follow up with a new cardiologist in 6 months         History of Presenting Illness:    Anthony Briceno is a very pleasant 74 year old male with a history of coronary artery disease, hypertension, hyperlipidemia, obesity, GERD, prostate cancer and hypothyroidism.     Patient's coronary disease history dates back to winter 2023 when he experienced 3 to 4 months of increased burping and epigastric fullness.  He was ultimately led to stenting of the second diagonal in February 2024.  He had mild disease elsewhere.  He had an echocardiogram at that time which showed normal  left ventricular ejection fraction with mild concentric left ventricular hypertrophy, no wall motion abnormalities, no significant valvular pathology.    He was seen last by Dr. Meraz in 2024 at which time he was feeling well without any concerning symptoms.  At that time he was transitioned to Plavix monotherapy and added to his regimen.    Patient is here today for a follow up of his coronary artery disease.     Patient reports feeling good.  Denies any epigastric pain or fullness.Patient denies chest pain or chest tightness. Denies dizziness, lightheadedness or other presyncopal symptoms. Denies tachycardia or palpitations.  Denies orthopnea or PND.    Most recent labs from 10/2/24 show total cholesterol 119, HDL 47, 52, triglycerides 98, ALT 11.     Blood pressure 108/74 and HR 68 in clinic today. Denies any bleeding issues with plavix.     He has gained 13 lbs since the last visit. Notes he had dietary indiscretions over the holidays. He has not been exercising since the weather has been cold.  He notes he is a little short of breath with exertion, unchanged for several years.        Recent Hospitalizations   None in [id number]           Social History      Social History     Socioeconomic History    Marital status:      Spouse name: Not on file    Number of children: Not on file    Years of education: Not on file    Highest education level: Not on file   Occupational History    Not on file   Tobacco Use    Smoking status: Never    Smokeless tobacco: Never   Vaping Use    Vaping status: Never Used   Substance and Sexual Activity    Alcohol use: Yes     Comment: specal occ. only    Drug use: No    Sexual activity: Not on file   Other Topics Concern    Not on file   Social History Narrative    Not on file     Social Drivers of Health     Financial Resource Strain: Not on file   Food Insecurity: Not on file   Transportation Needs: Not on file   Physical Activity: Not on file   Stress: Not on file   Social  "Connections: Not on file   Interpersonal Safety: Not on file   Housing Stability: Not on file            Review of Systems:   Skin:        Eyes:       ENT:       Respiratory:  Positive for dyspnea on exertion  Cardiovascular:  Negative    Gastroenterology:      Genitourinary:       Musculoskeletal:       Neurologic:       Psychiatric:       Heme/Lymph/Imm:       Endocrine:              Physical Exam:   Vitals: /74   Pulse 68   Ht 1.791 m (5' 10.5\")   Wt 112.9 kg (248 lb 14.4 oz)   SpO2 96%   BMI 35.21 kg/m     Wt Readings from Last 4 Encounters:   02/27/25 112.9 kg (248 lb 14.4 oz)   08/28/24 106.8 kg (235 lb 8 oz)   02/28/24 109 kg (240 lb 6.4 oz)   02/16/24 111.1 kg (245 lb)     GEN: well nourished, in no acute distress.  HEENT:  Pupils equal, round. Sclerae nonicteric.   NECK: Supple, no masses appreciated. No JVD with patient supine.  C/V:  Regular rate and rhythm, no murmur, rub or gallop.    RESP: Respirations are unlabored. Clear to auscultation bilaterally without wheezing, rales, or rhonchi.  GI: Abdomen soft, nontender.  EXTREM: Bilateral ankle edema.  NEURO: Alert and oriented, cooperative.  SKIN: Warm and dry.        Data:     LIPID RESULTS:  Lab Results   Component Value Date    CHOL 119 10/02/2024    HDL 47 10/02/2024    LDL 52 10/02/2024    TRIG 98 10/02/2024    TRIG 115 02/09/2024     LIVER ENZYME RESULTS:  Lab Results   Component Value Date    AST 22 04/29/2024    AST 22 12/14/2013    ALT 11 10/02/2024    ALT 28 12/14/2013     CBC RESULTS:  Lab Results   Component Value Date    WBC 5.7 02/16/2024    WBC 9.1 07/26/2016    RBC 4.42 02/16/2024    RBC 4.07 (L) 07/26/2016    HGB 13.1 (L) 02/16/2024    HGB 13.6 07/26/2016    HCT 43.1 02/16/2024    HCT 39.6 (L) 07/26/2016    MCV 98 02/16/2024    MCV 97 07/26/2016    MCH 33.3 (H) 02/16/2024    MCH 33.4 (H) 07/26/2016    MCHC 34.1 02/16/2024    MCHC 34.3 07/26/2016    RDW 13.2 02/16/2024    RDW 13.3 07/26/2016     02/16/2024     " "07/26/2016     BMP RESULTS:  Lab Results   Component Value Date     02/16/2024     07/26/2016    POTASSIUM 4.1 02/16/2024    POTASSIUM 4.7 07/31/2021    POTASSIUM 3.8 07/26/2016    CHLORIDE 103 02/16/2024    CHLORIDE 103 02/09/2024    CHLORIDE 108 07/26/2016    CO2 29 02/16/2024    CO2 31 07/31/2021    CO2 25 07/26/2016    ANIONGAP 7 02/16/2024    ANIONGAP 2 (L) 07/31/2021    ANIONGAP 8 07/26/2016     (H) 02/16/2024     (H) 07/31/2021     (H) 07/26/2016    BUN 14.1 02/16/2024    BUN 13 07/31/2021    BUN 12 07/26/2016    CR 1.08 02/16/2024    CR 1.09 07/26/2016    GFRESTIMATED 72 02/16/2024    GFRESTIMATED 67 07/26/2016    GFRESTBLACK 82 07/26/2016    CAPRI 9.1 02/16/2024    CAPRI 9.5 07/26/2016      A1C RESULTS:  No results found for: \"A1C\"  INR RESULTS:  Lab Results   Component Value Date    INR 0.96 02/16/2024    INR 0.97 04/03/2006            Medications     Current Outpatient Medications   Medication Sig Dispense Refill    atorvastatin (LIPITOR) 80 MG tablet Take 1 tablet (80 mg) by mouth daily. 90 tablet 1    Bimatoprost (LUMIGAN OP) One drop in each eye daily      clopidogrel (PLAVIX) 75 MG tablet Take 1 tablet (75 mg) by mouth daily. 90 tablet 2    ezetimibe (ZETIA) 10 MG tablet Take 1 tablet (10 mg) by mouth daily. 90 tablet 4    fexofenadine (ALLEGRA) 180 MG tablet Take 180 mg by mouth as needed for allergies      levothyroxine (SYNTHROID/LEVOTHROID) 137 MCG tablet Take 1 tablet by mouth daily at 2 pm.      loratadine (CLARITIN) 10 MG tablet Take 10 mg by mouth daily      meclizine (ANTIVERT) 25 MG tablet Take 1 tablet (25 mg) by mouth 4 times daily as needed for dizziness 30 tablet 0    metoprolol succinate ER (TOPROL XL) 25 MG 24 hr tablet Take 1 tablet by mouth daily at 2 pm      nitroGLYcerin (NITROSTAT) 0.4 MG sublingual tablet For chest pain place 1 tablet under the tongue every 5 minutes for 3 doses. If symptoms persist 5 minutes after 1st dose call 687. 54 tablet 1    " OMEPRAZOLE PO Take 40 mg by mouth every morning       Vitamin D, Cholecalciferol, 25 MCG (1000 UT) CAPS Take by mouth daily            Past Medical History     Past Medical History:   Diagnosis Date    Cancer (H)     Gastro-oesophageal reflux disease     High cholesterol     Hypertension     Hypothyroid     Prostate cancer (H)      Past Surgical History:   Procedure Laterality Date    APPENDECTOMY      COLONOSCOPY      CV ABDOMINAL ANGIOGRAM N/A 2/16/2024    Procedure: Aortogram Abdominal;  Surgeon: Mukul Savage MD;  Location:  [facility]    CV CORONARY ANGIOGRAM N/A 2/16/2024    Procedure: Coronary Angiogram;  Surgeon: Mukul Savage MD;  Location: [facility] CARDIAC CATH LAB    CV CORONARY ANGIOGRAM N/A 2/16/2024    Procedure: Coronary Angiogram;  Surgeon: Mukul Savage MD;  Location: [facility] CARDIAC CATH LAB    CV LEFT HEART CATH N/A 2/16/2024    Procedure: Left Heart Catheterization;  Surgeon: Mukul Savage MD;  Location: [facility] CARDIAC CATH LAB    CV PCI N/A 2/16/2024    Procedure: Percutaneous Coronary Intervention;  Surgeon: Mukul Savage MD;  Location: [facility] CARDIAC CATH LAB    GENITOURINARY SURGERY      HERNIA REPAIR      PROSTATE SURGERY  2009     History reviewed. No pertinent family history.         Allergies   Ancef [cefazolin sodium]        Micki Choi NP  [facility]

## 2025-03-22 ENCOUNTER — HEALTH MAINTENANCE LETTER (OUTPATIENT)
Age: 75
End: 2025-03-22

## 2025-05-07 DIAGNOSIS — R07.9 CHEST PAIN, UNSPECIFIED TYPE: ICD-10-CM

## 2025-05-07 RX ORDER — NITROGLYCERIN 0.4 MG/1
TABLET SUBLINGUAL
Qty: 25 TABLET | Refills: 3 | Status: SHIPPED | OUTPATIENT
Start: 2025-05-07

## 2025-05-08 ENCOUNTER — EXTERNAL ORDER RESULTS (OUTPATIENT)
Dept: LAB | Facility: CLINIC | Age: 75
End: 2025-05-08

## 2025-05-08 ENCOUNTER — TRANSFERRED RECORDS (OUTPATIENT)
Dept: HEALTH INFORMATION MANAGEMENT | Facility: CLINIC | Age: 75
End: 2025-05-08

## 2025-07-31 DIAGNOSIS — E78.5 HYPERLIPIDEMIA WITH TARGET LDL LESS THAN 70: ICD-10-CM

## 2025-07-31 DIAGNOSIS — I25.10 CORONARY ARTERY DISEASE INVOLVING NATIVE CORONARY ARTERY OF NATIVE HEART WITHOUT ANGINA PECTORIS: ICD-10-CM

## 2025-07-31 RX ORDER — ATORVASTATIN CALCIUM 80 MG/1
80 TABLET, FILM COATED ORAL DAILY
Qty: 90 TABLET | Refills: 1 | Status: SHIPPED | OUTPATIENT
Start: 2025-07-31

## 2025-08-28 ENCOUNTER — OFFICE VISIT (OUTPATIENT)
Dept: CARDIOLOGY | Facility: CLINIC | Age: 75
End: 2025-08-28
Attending: INTERNAL MEDICINE
Payer: MEDICARE

## 2025-08-28 ENCOUNTER — LAB (OUTPATIENT)
Dept: LAB | Facility: CLINIC | Age: 75
End: 2025-08-28
Payer: MEDICARE

## 2025-08-28 VITALS
OXYGEN SATURATION: 92 % | WEIGHT: 241 LBS | DIASTOLIC BLOOD PRESSURE: 71 MMHG | BODY MASS INDEX: 33.74 KG/M2 | HEART RATE: 70 BPM | HEIGHT: 71 IN | SYSTOLIC BLOOD PRESSURE: 104 MMHG

## 2025-08-28 DIAGNOSIS — I25.10 CORONARY ARTERY DISEASE INVOLVING NATIVE CORONARY ARTERY OF NATIVE HEART WITHOUT ANGINA PECTORIS: ICD-10-CM

## 2025-08-28 DIAGNOSIS — I10 BENIGN ESSENTIAL HYPERTENSION: Primary | ICD-10-CM

## 2025-08-28 DIAGNOSIS — E78.5 HYPERLIPIDEMIA WITH TARGET LDL LESS THAN 70: ICD-10-CM

## 2025-08-28 DIAGNOSIS — R07.9 CHEST PAIN, UNSPECIFIED TYPE: ICD-10-CM

## 2025-08-28 DIAGNOSIS — Z98.890 STATUS POST CORONARY ANGIOGRAM: ICD-10-CM

## 2025-08-28 LAB
ALBUMIN (EXTERNAL): 4 G/DL (ref 3.8–4.8)
ALK PHOSPHATASE (EXTERNAL): 77 IU/L (ref 44–121)
ALT SERPL W P-5'-P-CCNC: 11 U/L (ref 0–70)
ALT SERPL-CCNC: 11 IU/L (ref 0–44)
ANION GAP SERPL CALCULATED.3IONS-SCNC: 17 MMOL/L (ref 7–15)
AST SERPL-CCNC: 14 IU/L (ref 0–40)
BILIRUB SERPL-MCNC: 0.7 MG/DL (ref 0–1.2)
BUN SERPL-MCNC: 11.6 MG/DL (ref 8–23)
BUN SERPL-MCNC: 13 MG/DL (ref 8–27)
BUN/CREATININE RATIO (EXTERNAL): 11 (ref 10–24)
CALCIUM (EXTERNAL): 9.4 MG/DL (ref 8.6–10.2)
CALCIUM SERPL-MCNC: 9.9 MG/DL (ref 8.8–10.4)
CHLORIDE (EXTERNAL): 106 MMOL/L (ref 96–106)
CHLORIDE SERPL-SCNC: 102 MMOL/L (ref 98–107)
CHOLEST SERPL-MCNC: 132 MG/DL
CO2 (EXTERNAL): 26 MMOL/L (ref 20–29)
CREAT SERPL-MCNC: 1.09 MG/DL (ref 0.67–1.17)
CREATININE (EXTERNAL): 1.23 MG/DL (ref 0.76–1.27)
EGFRCR SERPLBLD CKD-EPI 2021: 70 ML/MIN/1.73M2
FASTING STATUS PATIENT QL REPORTED: YES
GFR ESTIMATED (EXTERNAL): 61 ML/MIN/1.73M2
GLUCOSE (EXTERNAL): 103 MG/DL (ref 70–99)
GLUCOSE (EXTERNAL): 113 MG/DL (ref 70–99)
GLUCOSE SERPL-MCNC: 116 MG/DL (ref 70–99)
HBA1C MFR BLD: 5.7 G/DL (ref 4.8–5.6)
HCO3 SERPL-SCNC: 20 MMOL/L (ref 22–29)
HDLC SERPL-MCNC: 56 MG/DL
LDLC SERPL CALC-MCNC: 47 MG/DL
NONHDLC SERPL-MCNC: 76 MG/DL
POTASSIUM (EXTERNAL): 4.9 MMOL/L (ref 3.5–5.2)
POTASSIUM SERPL-SCNC: 4.3 MMOL/L (ref 3.4–5.3)
PROTEIN TOTAL (EXTERNAL): 6.4 G/DL (ref 6–8.5)
PSA (EXTERNAL): <0.1 NG/ML (ref 0–4)
SODIUM (EXTERNAL): 145 MMOL/L (ref 134–144)
SODIUM SERPL-SCNC: 139 MMOL/L (ref 135–145)
T4 FREE SERPL-MCNC: 1.54 NG/DL (ref 0.82–1.77)
TRIGL SERPL-MCNC: 144 MG/DL
TSH SERPL-ACNC: 0.32 UIU/ML (ref 0.45–4.5)
TSH SERPL-ACNC: 3.36 UIU/ML (ref 0.45–4.5)

## 2025-08-28 RX ORDER — CLOPIDOGREL BISULFATE 75 MG/1
75 TABLET ORAL DAILY
Qty: 90 TABLET | Refills: 3 | Status: SHIPPED | OUTPATIENT
Start: 2025-08-28

## 2025-08-28 RX ORDER — EZETIMIBE 10 MG/1
10 TABLET ORAL DAILY
Qty: 90 TABLET | Refills: 4 | Status: SHIPPED | OUTPATIENT
Start: 2025-08-28

## 2025-08-28 RX ORDER — ATORVASTATIN CALCIUM 80 MG/1
80 TABLET, FILM COATED ORAL DAILY
Qty: 90 TABLET | Refills: 3 | Status: SHIPPED | OUTPATIENT
Start: 2025-08-28

## 2025-08-28 RX ORDER — METOPROLOL SUCCINATE 25 MG/1
25 TABLET, EXTENDED RELEASE ORAL
Qty: 90 TABLET | Refills: 3 | Status: SHIPPED | OUTPATIENT
Start: 2025-08-28

## 2025-08-28 RX ORDER — NITROGLYCERIN 0.4 MG/1
TABLET SUBLINGUAL
Qty: 25 TABLET | Refills: 1 | Status: SHIPPED | OUTPATIENT
Start: 2025-08-28

## (undated) DEVICE — CATH LAUNCHER 6FR LA6EBU375

## (undated) DEVICE — KIT HAND CONTROL ANGIOTOUCH ACIST 65CM AT-P65

## (undated) DEVICE — INFL DVC BASIXCOMPAK PLYCRB 30 ATM 13IN 20ML IN4530

## (undated) DEVICE — CATH ANGIO INFINITI JL5 4FRX100CM 538422

## (undated) DEVICE — TOTE ANGIO CORP PC15AT SAN32CC83O

## (undated) DEVICE — DEFIB PRO-PADZ LVP LQD GEL ADULT 8900-2105-01

## (undated) DEVICE — CATH ANGIO INFINITI 3DRC 4FRX100CM 538476

## (undated) DEVICE — MANIFOLD KIT ANGIO AUTOMATED 014613

## (undated) DEVICE — INTRO SHEATH 4FRX10CM PINNACLE RSS402

## (undated) DEVICE — Device

## (undated) DEVICE — CATH DIAG 4FR JL 4.5 538417

## (undated) DEVICE — NDL PERC ENTRY THINWALL 18GA 7.0" G00166

## (undated) DEVICE — INTRODUCER SHEATH GREEN 6FRX24CM ARROW FLEX CL-07624

## (undated) DEVICE — CATH DIAG 4FR ANG PIG 538453S

## (undated) DEVICE — VALVE HEMOSTASIS GUARDIAN II OD8 FR GUIDEWIRE 8215

## (undated) DEVICE — GUIDEWIRE VASC 0.014INX180CM RUNTHROUGH 25-1011

## (undated) RX ORDER — ATROPINE SULFATE 0.1 MG/ML
INJECTION INTRAVENOUS
Status: DISPENSED
Start: 2024-02-16

## (undated) RX ORDER — HEPARIN SODIUM 200 [USP'U]/100ML
INJECTION, SOLUTION INTRAVENOUS
Status: DISPENSED
Start: 2024-02-16

## (undated) RX ORDER — HEPARIN SODIUM 1000 [USP'U]/ML
INJECTION, SOLUTION INTRAVENOUS; SUBCUTANEOUS
Status: DISPENSED
Start: 2024-02-16

## (undated) RX ORDER — FENTANYL CITRATE 50 UG/ML
INJECTION, SOLUTION INTRAMUSCULAR; INTRAVENOUS
Status: DISPENSED
Start: 2024-02-16

## (undated) RX ORDER — NITROGLYCERIN 5 MG/ML
VIAL (ML) INTRAVENOUS
Status: DISPENSED
Start: 2024-02-16

## (undated) RX ORDER — LIDOCAINE HYDROCHLORIDE 10 MG/ML
INJECTION, SOLUTION EPIDURAL; INFILTRATION; INTRACAUDAL; PERINEURAL
Status: DISPENSED
Start: 2024-02-16